# Patient Record
Sex: MALE | Race: WHITE
[De-identification: names, ages, dates, MRNs, and addresses within clinical notes are randomized per-mention and may not be internally consistent; named-entity substitution may affect disease eponyms.]

---

## 2017-03-21 ENCOUNTER — HOSPITAL ENCOUNTER (INPATIENT)
Dept: HOSPITAL 25 - ED | Age: 82
LOS: 2 days | Discharge: HOME | DRG: 381 | End: 2017-03-23
Attending: INTERNAL MEDICINE | Admitting: HOSPITALIST
Payer: MEDICARE

## 2017-03-21 DIAGNOSIS — K22.11: Primary | ICD-10-CM

## 2017-03-21 DIAGNOSIS — I48.91: ICD-10-CM

## 2017-03-21 DIAGNOSIS — I10: ICD-10-CM

## 2017-03-21 DIAGNOSIS — Z72.89: ICD-10-CM

## 2017-03-21 DIAGNOSIS — D62: ICD-10-CM

## 2017-03-21 DIAGNOSIS — G47.30: ICD-10-CM

## 2017-03-21 LAB
ADD DIFF/SLIDE REVIEW?: (no result)
ALBUMIN SERPL BCG-MCNC: 3.6 G/DL (ref 3.2–5.2)
ALP SERPL-CCNC: 54 U/L (ref 34–104)
ALT SERPL W P-5'-P-CCNC: 13 U/L (ref 7–52)
ANION GAP SERPL CALC-SCNC: 6 MMOL/L (ref 2–11)
AST SERPL-CCNC: 13 U/L (ref 13–39)
BUN SERPL-MCNC: 17 MG/DL (ref 6–24)
BUN/CREAT SERPL: 14.5 (ref 8–20)
CALCIUM SERPL-MCNC: 8.8 MG/DL (ref 8.6–10.3)
CHLORIDE SERPL-SCNC: 104 MMOL/L (ref 101–111)
GLOBULIN SER CALC-MCNC: 2.5 G/DL (ref 2–4)
GLUCOSE SERPL-MCNC: 101 MG/DL (ref 70–100)
HCO3 SERPL-SCNC: 24 MMOL/L (ref 22–32)
HCT VFR BLD AUTO: 19 % (ref 42–52)
HGB BLD-MCNC: 5.8 G/DL (ref 14–18)
HYPOCHROMIA BLD QL: (no result)
MACROCYTES BLD QL: (no result)
MCH RBC QN AUTO: 25 PG (ref 27–31)
MCHC RBC AUTO-ENTMCNC: 31 G/DL (ref 31–36)
MCV RBC AUTO: 82 FL (ref 80–94)
MICROCYTES BLD QL SMEAR: (no result)
POLYCHROMASIA BLD QL SMEAR: (no result)
POTASSIUM SERPL-SCNC: 3.9 MMOL/L (ref 3.5–5)
PROT SERPL-MCNC: 6.1 G/DL (ref 6.4–8.9)
RBC # BLD AUTO: 2.3 10^6/UL (ref 4–5.4)
SODIUM SERPL-SCNC: 134 MMOL/L (ref 133–145)
TROPONIN I SERPL-MCNC: 0.02 NG/ML (ref ?–0.04)
WBC # BLD AUTO: 8.1 10^3/UL (ref 3.5–10.8)

## 2017-03-21 PROCEDURE — 36415 COLL VENOUS BLD VENIPUNCTURE: CPT

## 2017-03-21 PROCEDURE — 94760 N-INVAS EAR/PLS OXIMETRY 1: CPT

## 2017-03-21 PROCEDURE — 93005 ELECTROCARDIOGRAM TRACING: CPT

## 2017-03-21 PROCEDURE — P9040 RBC LEUKOREDUCED IRRADIATED: HCPCS

## 2017-03-21 PROCEDURE — 85027 COMPLETE CBC AUTOMATED: CPT

## 2017-03-21 PROCEDURE — 86922 COMPATIBILITY TEST ANTIGLOB: CPT

## 2017-03-21 PROCEDURE — 83605 ASSAY OF LACTIC ACID: CPT

## 2017-03-21 PROCEDURE — 85025 COMPLETE CBC W/AUTO DIFF WBC: CPT

## 2017-03-21 PROCEDURE — 71020: CPT

## 2017-03-21 PROCEDURE — 94660 CPAP INITIATION&MGMT: CPT

## 2017-03-21 PROCEDURE — 85014 HEMATOCRIT: CPT

## 2017-03-21 PROCEDURE — 82272 OCCULT BLD FECES 1-3 TESTS: CPT

## 2017-03-21 PROCEDURE — 86901 BLOOD TYPING SEROLOGIC RH(D): CPT

## 2017-03-21 PROCEDURE — 84484 ASSAY OF TROPONIN QUANT: CPT

## 2017-03-21 PROCEDURE — 86900 BLOOD TYPING SEROLOGIC ABO: CPT

## 2017-03-21 PROCEDURE — 80053 COMPREHEN METABOLIC PANEL: CPT

## 2017-03-21 PROCEDURE — 83880 ASSAY OF NATRIURETIC PEPTIDE: CPT

## 2017-03-21 PROCEDURE — 86850 RBC ANTIBODY SCREEN: CPT

## 2017-03-21 PROCEDURE — 85018 HEMOGLOBIN: CPT

## 2017-03-21 RX ADMIN — SODIUM CHLORIDE SCH MLS/HR: 900 IRRIGANT IRRIGATION at 23:25

## 2017-03-21 NOTE — RAD
Indication: Shortness of breath.



2 views of the chest demonstrates no mediastinal shift. There is cardiomegaly. Left

pleural effusion is noted. There is likely a small right pleural effusion. Interstitial

edema is noted.



IMPRESSION: Cardiomegaly. Left pleural effusion is noted. Likely small right pleural

effusion is noted.

## 2017-03-21 NOTE — ED
Wayne ROMO Rebecca, scribed for Reji Ramos MD on 03/21/17 at 2124 .





Shortness of Breath





- HPI Summary


HPI Summary: 


Pt is an 86 y/o M BIBA who presents to ED c/o SOB. SOB began gradually 10 days 

ago and is slightly worse today than it has been previously. SOB characterized 

as mild dyspnea at exertion. Sx aggravated by exertion, alleviated by nothing. 

Denies any CP. Reports he does not typically use O2 per Os at home. 





- History of Current Complaint


Chief Complaint: EDShortnessOfBreath


Time Seen by Provider: 03/21/17 20:56


Hx Obtained From: Patient


Onset/Duration: Gradual Onset, Lasting Days - 10 days, Still Present


Timing: Constant


Dyspnea At: Exertion


Aggrevating Factors: Movement


Alleviating Factors: Nothing


Associated Signs & Symptoms: Negative





- Allergy/Home Medications


Allergies/Adverse Reactions: 


 Allergies











Allergy/AdvReac Type Severity Reaction Status Date / Time


 


No Known Allergies Allergy   Verified 12/10/14 15:44











Home Medications: 


 Home Medications





Albuterol 0.5% CONC NEB.SOL* 1 ml .SEE ORDER Q4HR PRN 03/21/17 [History 

Confirmed 03/21/17]


Cholecalciferol [Vitamin D] 1,000 unit PO DAILY 03/21/17 [History Confirmed 03/ 21/17]


Diltiazem CD CAP* [Cardizem CD CAP*] 240 mg PO DAILY 03/21/17 [History 

Confirmed 03/21/17]


Fluticasone-Salmeterol 100-50* [Advair Diskus 100-50*] 1 puff INH BID 03/21/17 [

History Confirmed 03/21/17]


Psyllium RODNEY* [Metamucil RODNEY*] 1 pkt PO DAILY 03/21/17 [History Confirmed 03/21/ 17]











PMH/Surg Hx/FS Hx/Imm Hx


Endocrine/Hematology History: 


   Denies: Hx Diabetes


Cardiovascular History: Reports: Hx Hypertension


   Denies: Hx Angina


Respiratory History: 


   Denies: Hx Asthma, Hx Chronic Obstructive Pulmonary Disease (COPD)


   Comment Only: Other Respiratory Problems/Disorders - pt states wheezes 

occasionally


Sensory History: Reports: Hx Contacts or Glasses


Opthamlomology History: Reports: Hx Contacts or Glasses


Infectious Disease History: No


Infectious Disease History: 


   Denies: Traveled Outside the [place] in Last 30 Days





- Family History


Known Family History: 


   Negative: Cardiac Disease, Hypertension, Diabetes





- Social History


Alcohol Use: Daily


Alcohol Amount: 3 MIXED DRINKS PER DAY


Substance Use Type: Reports: None


Hx Tobacco Use: No


Smoking Status (MU): Never Smoked Tobacco





Review of Systems


Negative: Chest Pain


Positive: Shortness Of Breath - dyspnea at exertion


All Other Systems Reviewed And Are Negative: Yes





Physical Exam


Triage Information Reviewed: Yes


Vital Signs On Initial Exam: 


 Initial Vitals











Temp Pulse Resp BP Pulse Ox


 


 98.4 F   82   20   130/65   100 


 


 03/21/17 20:56  03/21/17 20:56  03/21/17 20:56  03/21/17 20:56  03/21/17 20:56











Vital Signs Reviewed: Yes


Appearance: Positive: Well-Appearing, No Pain Distress


Skin: Positive: Warm, Pale


Eyes: Positive: JASEN


ENT: Positive: Hearing grossly normal


Neck: Positive: Supple


Respiratory/Lung Sounds: Positive: Clear to Auscultation, Breath Sounds Present


Cardiovascular: Positive: RRR


Abdomen Description: Positive: Nontender, Soft


Bowel Sounds: Positive: Present


Musculoskeletal: Positive: Strength/ROM Intact


Neurological: Positive: Sensory/Motor Intact, Alert, Oriented to Person Place, 

Time, Normal Gait


Psychiatric: Positive: Affect/Mood Appropriate





- Ashish Coma Scale


Coma Scale Total: 15





Diagnostics





- Vital Signs


 Vital Signs











  Temp Pulse Resp BP Pulse Ox


 


 03/21/17 20:56  98.4 F  82  20  130/65  100














- Laboratory


Result Diagrams: 


 03/22/17 03:05





 03/21/17 21:30


Lab Statement: Any lab studies that have been ordered have been reviewed, and 

results considered in the medical decision making process.





- Radiology


  ** CXR


Radiology Interpretation Completed By: Radiologist - Cardiomegaly. Left pleural 

effusion is noted. Likely small right pleural effusion is noted.





- EKG


  ** 2203


Cardiac Rate: NL - 87 bpm


EKG Rhythm: Atrial Fibrillation


EKG Interpretation: RBBB





Re-Evaluation





- Re-Evaluation


  ** First Eval


Comment: results d/w pt.  pt with dark stools for 1 month





Course/Dx





- Course


Assessment/Plan: Pt is an 86 y/o M BIBA with a CC of mild dyspnea at exertion 

for 10 days, aggravated by exertion. Denies any CP. Hgb 5.8. . CXR 

reveals "Cardiomegaly. Left pleural effusion is noted. Likely small right 

pleural effusion is noted." EKG reveals A fib and RBBB. Discussed care of pt 

with Dr. Sandoval to inform him of pt.





- Diagnoses


Provider Diagnoses: 


 GI bleed








- Physician Notifications


Discussed Care of Patient With: Dr. Sandoval, hopsitalist, who was informed of pt.


Time Discussed With Above Provider: 22:00


Instructed by Provider To: Admit As Inpatient





Discharge





- Discharge Plan


Condition: Guarded


Disposition: ADMITTED TO [facility]





The documentation as recorded by the Wayne berry Rebecca accurately 

reflects the service I personally performed and the decisions made by me, 

Reji Ramos MD.

## 2017-03-22 LAB
HCT VFR BLD AUTO: 16 % (ref 42–52)
HCT VFR BLD AUTO: 19 % (ref 42–52)
HCT VFR BLD AUTO: 22 % (ref 42–52)
HCT VFR BLD AUTO: 23 % (ref 42–52)
HCT VFR BLD AUTO: 26 % (ref 42–52)
HGB BLD-MCNC: 4.9 G/DL (ref 14–18)
HGB BLD-MCNC: 5.9 G/DL (ref 14–18)
HGB BLD-MCNC: 6.8 G/DL (ref 14–18)
HGB BLD-MCNC: 7.2 G/DL (ref 14–18)
HGB BLD-MCNC: 8.2 G/DL (ref 14–18)

## 2017-03-22 PROCEDURE — 0DJ08ZZ INSPECTION OF UPPER INTESTINAL TRACT, VIA NATURAL OR ARTIFICIAL OPENING ENDOSCOPIC: ICD-10-PCS | Performed by: INTERNAL MEDICINE

## 2017-03-22 PROCEDURE — 30233N1 TRANSFUSION OF NONAUTOLOGOUS RED BLOOD CELLS INTO PERIPHERAL VEIN, PERCUTANEOUS APPROACH: ICD-10-PCS | Performed by: INTERNAL MEDICINE

## 2017-03-22 RX ADMIN — OMEPRAZOLE SCH MG: 20 CAPSULE, DELAYED RELEASE ORAL at 21:32

## 2017-03-22 RX ADMIN — MOMETASONE FUROATE AND FORMOTEROL FUMARATE DIHYDRATE SCH: 100; 5 AEROSOL RESPIRATORY (INHALATION) at 21:15

## 2017-03-22 RX ADMIN — SODIUM CHLORIDE SCH MLS/HR: 900 IRRIGANT IRRIGATION at 01:57

## 2017-03-22 RX ADMIN — PANTOPRAZOLE SODIUM SCH MLS/HR: 40 INJECTION, POWDER, FOR SOLUTION INTRAVENOUS at 12:27

## 2017-03-22 RX ADMIN — PANTOPRAZOLE SODIUM SCH MLS/HR: 40 INJECTION, POWDER, FOR SOLUTION INTRAVENOUS at 01:47

## 2017-03-22 RX ADMIN — SODIUM CHLORIDE SCH MLS/HR: 900 IRRIGANT IRRIGATION at 11:00

## 2017-03-22 NOTE — PN
Subjective


Date of Service: 03/22/17


Interval History: 





Pt seen and examined with 2 friends present after EGD


Hungry and just finished meal


Has no abdominal pain 


No longer feels LH, no CP, SOB. Has not been up to walk





Objective


Active Medications: 








Mometasone Furoate/Formoterol Fumar (Dulera 100/5 Mdi*)  2 puff INH BID DARLINE


Omeprazole (Prilosec Cap*)  20 mg PO BID DARLINE








 Vital Signs











  03/21/17 03/21/17 03/21/17





  23:06 23:30 23:38


 


Temperature   98.8 F


 


Pulse Rate 85 96 


 


Respiratory 22 22 





Rate   


 


Blood Pressure 130/65 126/67 





(mmHg)   


 


O2 Sat by Pulse 97 95 





Oximetry   














  03/21/17 03/22/17 03/22/17





  23:45 00:00 00:01


 


Temperature   


 


Pulse Rate 90 85 95


 


Respiratory 21 23 22





Rate   


 


Blood Pressure 130/66  130/81





(mmHg)   


 


O2 Sat by Pulse 96 96 96





Oximetry   














  03/22/17 03/22/17 03/22/17





  00:08 00:15 01:20


 


Temperature  98.4 F 98.1 F


 


Pulse Rate 95 111 99


 


Respiratory 22 18 20





Rate   


 


Blood Pressure  121/56 133/86





(mmHg)   


 


O2 Sat by Pulse 95 100 





Oximetry   














  03/22/17 03/22/17 03/22/17





  02:43 03:13 03:57


 


Temperature  98.5 F 98.4 F


 


Pulse Rate  106 74


 


Respiratory 18 16 20





Rate   


 


Blood Pressure  122/68 109/65





(mmHg)   


 


O2 Sat by Pulse  94 





Oximetry   














  03/22/17 03/22/17 03/22/17





  04:12 07:15 08:00


 


Temperature 98.2 F 98.2 F 


 


Pulse Rate 84 101 


 


Respiratory 21 20 16





Rate   


 


Blood Pressure 119/59 126/68 





(mmHg)   


 


O2 Sat by Pulse  96 





Oximetry   














  03/22/17 03/22/17





  11:10 15:34


 


Temperature 98.5 F 98.1 F


 


Pulse Rate 85 97


 


Respiratory 20 20





Rate  


 


Blood Pressure 121/71 127/70





(mmHg)  


 


O2 Sat by Pulse 97 96





Oximetry  











Oxygen Devices in Use Now: None


Appearance: NAD, younger than stated age


Eyes: No Scleral Icterus, PERRLA


Ears/Nose/Mouth/Throat: Clear Oropharnyx, Mucous Membranes Moist


Neck: NL Appearance and Movements; NL JVP, Trachea Midline


Respiratory: Symmetrical Chest Expansion and Respiratory Effort, Clear to 

Auscultation


Cardiovascular: - - IRIR


Abdominal: NL Sounds; No Tenderness; No Distention, No Hepatosplenomegaly


Lymphatic: No Cervical Adenopathy


Extremities: - - trace-1+ edema


Skin: No Rash or Ulcers


Neurological: Alert and Oriented x 3


Result Diagrams: 


 03/22/17 15:56





 03/21/17 21:30





Assess/Plan/Problems-Billing


Assessment: 





86 yo M p/w increasing SOB found with anemia s/p EGD 3/22 found with esophageal 

erosions. 





- Patient Problems


(1) Acute blood loss anemia


Comment: s/p 2 units PRBC, 3rd this evening for Hb <7


Suspected in setting of esophageal erosion on pradaxa


holding pradaxa


repeat H/H 1 hr after transfusion and again in AM   





(2) Afib


Comment: Holding pradaxa


discussed risk benefit with pt and he is in agreement with not restarting AC at 

this time   





(3) Esophageal erosions


Comment: Concern for pill esophagitis. Pt does report dry mouth and difficulty 

swallowing tabs (vs caps) although does not have medications he is currently 

taking in tab form





Start omeprazole   





(4) Sleep apnea


Comment: CPAP   





(5) DVT prophylaxis


Comment: SCDs in setting of GIB

## 2017-03-22 NOTE — HP
DATE OF ADMISSION:  03/21/17

 

CHIEF COMPLAINT:  Shortness of breath.

 

HISTORY OF PRESENT ILLNESS:  The patient is an 87-year-old gentleman who stated 
about 10 days ago he started feeling increased shortness of breath.  He is not 
worse when he sits or lies down, but it happens with almost minimal exertion. 
He has no chest pain.  His stools have been darker and may have been black. He 
has not had any recent use of Advil or Motrin.  He has had no nausea, vomiting 
and no abdominal pain.  He went to a restaurant [employer] and his friend became 
concerned, and called EMT. When he got to the ER, he was found to have 
hemoglobin of 5.8.

 

PAST MEDICAL HISTORY:  He has a past medical history significant for atrial 
fibrillation, diverticulitis, hip fracture, sleep apnea.

 

ALLERGIES:  He has no known drug allergies.

 

CURRENT MEDICATIONS:

1.  Metamucil one pack daily.

2.  Phosphatidylserine 300 mg daily

3.  Furosemide 20 mg p.o. 2 to 3 times a week.

4.  Advair Diskus 100/50 one puff twice daily

5.  Diltiazem  mg daily.

6.  Pradaxa 150 mg twice daily.

7.  Cholecalciferol 1000 units daily.

8.  Albuterol 1 cc every 4 hours as needed.

 

FAMILY HISTORY:  Reviewed and noncontributory.

 

SOCIAL HISTORY:  No tobacco, occasional alcohol.  No recreational drug use.  He 
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  His friend, Billy Wiley, [phone], is his healthcare proxy.

 

REVIEW OF SYSTEMS:  A 14-point review of systems was completed with the 
patient. All pertinent positives and negatives are in the history of present 
illness, otherwise negative.

 

                               PHYSICAL EXAMINATION

 

GENERAL:  A pleasant gentleman lying in bed, in no acute distress.

 

VITAL SIGNS:  Temperature 98.8 degrees, heart rate 96 beats per minute, 
respiratory rate 21 breaths per minute, pulse ox 96%, blood pressure 126/67.

 

HEENT:  Normocephalic, atraumatic.  Pupils are equal, round and reactive to 
light. Moist mucous membranes.

 

NECK:  Supple.  No JVD, bruits, palpable thyroid, or lymphadenopathy.

 

CHEST:  Clear to auscultation and percussion bilaterally.

 

CARDIOVASCULAR:  S1, S2 appreciated.

 

ABDOMEN:  Positive bowel sounds in all 4 quadrants.  Soft, nontender, and 
nondistended.

 

EXTREMITIES:  No cyanosis, clubbing, or edema.  +2 peripheral pulses 
bilaterally.

 

NEURO:  Alert and oriented x3.  Moves all extremities.

 

SKIN:  No rashes or abnormalities.

 

LAB DATA:  White count 8.1, hemoglobin 5.8, hematocrit 19, platelets 312, MCV 
82. Sodium 134, potassium 3.9, chloride 104, CO2 24, BUN 17, creatinine 1.7, 
glucose 101, lactic acid 2.1.

 

Chest x-ray shows cardiomegaly, left pleural fusion is noted, likely small 
right pleural fusion.

 

EKG shows AFib with a moderate ventricular response, no acute ST-T wave changes.

 

ASSESSMENT AND PLAN:

1.  Shortness of breath.  I believe this is likely related to his anemia.  His 
MCV is not too low, but I suspect he might have had a bleed over the last 
couple of weeks.  I will transfuse him 2 units of packed red blood cells.  
Recheck H\T\H every 6 hours.  We will ask GI to see him in the morning for 
scope.

2.  Atrial fibrillation.  We will need to hold Pradaxa.  Rate is adequately 
controlled at this time.

3.  FEN.  NPO, awaiting likely scope.

4.  DVT prophylaxis.  Sequential compression stockings.

5.  The patient is a full code. TIME SPENT: Over 75 minutes were spent on this 
H and P, more than 40 minutes of which were spent in direct face-to-face 
contact with the patient in evaluation, physical exam, counseling, and 
coordination of care.

 

CC:  Dr. Kimberley Tavares* 

 

 [id number]/CPS #: [id number]

MTDD

## 2017-03-23 VITALS — SYSTOLIC BLOOD PRESSURE: 146 MMHG | DIASTOLIC BLOOD PRESSURE: 76 MMHG

## 2017-03-23 LAB
HCT VFR BLD AUTO: 26 % (ref 42–52)
HGB BLD-MCNC: 8.2 G/DL (ref 14–18)
MCH RBC QN AUTO: 26 PG (ref 27–31)
MCHC RBC AUTO-ENTMCNC: 32 G/DL (ref 31–36)
MCV RBC AUTO: 83 FL (ref 80–94)
RBC # BLD AUTO: 3.13 10^6/UL (ref 4–5.4)
WBC # BLD AUTO: 10.2 10^3/UL (ref 3.5–10.8)

## 2017-03-23 RX ADMIN — OMEPRAZOLE SCH MG: 20 CAPSULE, DELAYED RELEASE ORAL at 09:00

## 2017-03-23 RX ADMIN — MOMETASONE FUROATE AND FORMOTEROL FUMARATE DIHYDRATE SCH PUFF: 100; 5 AEROSOL RESPIRATORY (INHALATION) at 13:19

## 2017-03-23 NOTE — PRO
PROCEDURE NOTE:

 

DATE OF PROCEDURE:  03/22/17

 

PROCEDURE PERFORMED:  Upper endoscopy.

 

MEDICINE:  Versed 4 mg IV.

 

NARRATIVE:  This is an 87-year-old gentleman presenting with anemia and the 
report of having black stools for about a week to two.  The patient does have a 
history of atrial fibrillation, on Pradaxa and was starting to feel short of 
breath the last few days.  He also mentions that his stools have been black.  
For that reason, he was seen in the emergency room where he was noted to be 
anemic with a hemoglobin of about 5.  He was transfused and is feeling better. 
For this reason, upper endoscopy is being carried out.

 

DESCRIPTION OF PROCEDURE:  After the procedure was discussed with the patient, 
risks and benefits were outlined, written consent was obtained.  The patient 
was placed in the left lateral decubitus position and conscious sedation was 
administered.  A video diagnostic gastroscope was inserted orally and advanced 
very carefully into the esophagus.  The esophagus, stomach, and duodenum to the 
second to third portion were well visualized.  The patient tolerated the 
procedure well. There were no immediate complications.

 

FINDINGS:  The proximal esophagus was normal.  However, the mid esophagus had 
some scattered erosions that were relatively deep, but not bleeding.  The more 
distal aspect of the esophagus was normal without any further erosions or any 
other lesion.  The stomach was entered.  There was bile in the stomach, but 
certainly no blood.  The gastric mucosa was normal.  There was no ulceration or 
inflammatory change.  The pylorus was normal and patent.  The duodenal bulb was 
normal and the second to third portion of the duodenum was normal with a normal 
folding pattern.

 

CONCLUSION:  Erosions identified in the mid esophagus which may have accounted 
for the patient's bleeding and otherwise normal upper endoscopy.

 

RECOMMENDATIONS:  The location of these erosions makes one wonder about a 
previous episode of pill esophagitis as it seems that this is right above the 
annabel, a common place where pills could get caught. In any event, I think it 
is healing and given his improvement in his H and H and no further bleeding, he 
could probably be observed.  If Pradaxa is necessary, that can probably be 
started in the next couple of days.  I will certainly keep him on a proton pump 
inhibitor for at least a month or two.

 

 [id number]/CPS #: [id number]

E.J. Noble HospitalPIETRO

## 2017-03-24 NOTE — DS
DISCHARGE SUMMARY:

 

DATE OF ADMISSION:  03/21/17

 

DATE OF DISCHARGE:  03/23/17

 

PRIMARY CARE PROVIDER:  Nancy Hurst.

 

PRIMARY DIAGNOSIS:  Gastrointestinal hemorrhage in the setting of esophageal 
erosions.

 

SECONDARY DIAGNOSES:  Include:

1.  Atrial fibrillation, on Pradaxa.

2.  Sleep apnea.

 

MEDICATIONS ON DISCHARGE:  Include:

1.  Metamucil 1 packet daily.

2.  Phosphatidylserine 300 mg daily

3.  Lasix 20 mg 2 to 3 times weekly.

4.  Advair 100/50 mcg 1 puff twice daily.

5.  Diltiazem  mg daily.

6.  Vitamin D 1000 units daily.

7.  Albuterol nebulizer 4 times a day as needed for shortness of breath.

8.  Prilosec 20 mg twice daily.

9.  Ferrous sulfate liquid 220 mg daily.

 

PERTINENT LABORATORY DATA:  Hemoglobin on presentation 4.9, on discharge 8.2 
after receiving of 3 units packed red blood cells.

 

PROCEDURES PERFORMED DURING HOSPITAL STAY:  Upper endoscopy performed by Dr. Hare on 03/22/17, conclusion:  Erosions identified in the mid esophagus, may 
have accounted for the patient's bleeding and otherwise normal upper endoscopy.

 

HISTORY OF PRESENT ILLNESS AND HOSPITAL COURSE:  This is an 87-year-old well 
functioning man who has been in usual state of health until prior to admission 
about 10 days, started to having increasing shortness of breath.  He was 
directed to the emergency room by one of his friends.  Additionally, he had 
noticed that his stools had been darker brown and possibly black prior to 
presentation.  His hemoglobin on presentation was notably 4.9 as indicated 
above likely accounting for the patient's symptoms including shortness of 
breath.  He received 2 units of packed red blood cells with adequate response 
to hemoglobin of 7.2.  He had endoscopy with resulted as indicated above 
notable for mid esophagus esophageal erosions, this was concerning for pill 
esophagitis.  In discussion with the patient, he does acknowledge that 
swallowing tab is difficult for him; however, does not have any medications in 
tablet form.  He was directed to crush any tablets that he should receive in 
the future.  Additionally, omeprazole 20 mg twice daily was added to the patient
's medication list in addition to iron in order to supplement his return to 
normal hematocrit.  There are no complications during the patient's hospital 
stay.  He will be restarted on all of his home medications except for Pradaxa.  
The risks and benefits withholding Pradaxa were discussed with the patient.  We 
agreed that holding for at least several weeks would be in his best interest in 
the setting of recent GI hemorrhage and known esophageal erosions.

 

At followup, please;

1.  Evaluate for resumption of Pradaxa.

2.  Recommend following hemoglobin and hematocrit closely, possibly every 6 
weeks to monitor for slow development of anemia.  If the patient has recurrent 
anemia, he may benefit from colonoscopy.

3.  No other specific labs or vitals that need followup.

 

Reasons to return to the hospital including but not limited to recurrent or 
worsening symptoms, including shortness of breath, chest pain, lightheadedness, 
loss of consciousness or near loss of consciousness, or dark black stools or 
bleeding from any source were discussed with the patient.  He acknowledged 
understanding.

 

TIME SPENT:  Greater than 45 minutes was spent on discharge of this patient 
with greater than half the time spent face-to-face with the patient.



CC:  Dr. Bonnie Guerrero*

 

 [id number]/CPS #: [id number]

BELL

## 2018-09-18 ENCOUNTER — HOSPITAL ENCOUNTER (EMERGENCY)
Dept: HOSPITAL 25 - ED | Age: 83
Discharge: HOME | End: 2018-09-18
Payer: MEDICARE

## 2018-09-18 VITALS — DIASTOLIC BLOOD PRESSURE: 86 MMHG | SYSTOLIC BLOOD PRESSURE: 132 MMHG

## 2018-09-18 DIAGNOSIS — R06.02: ICD-10-CM

## 2018-09-18 DIAGNOSIS — F10.121: Primary | ICD-10-CM

## 2018-09-18 DIAGNOSIS — I48.91: ICD-10-CM

## 2018-09-18 LAB
BASOPHILS # BLD AUTO: 0.1 10^3/UL (ref 0–0.2)
EOSINOPHIL # BLD AUTO: 0.2 10^3/UL (ref 0–0.6)
HCT VFR BLD AUTO: 44 % (ref 42–52)
HGB BLD-MCNC: 15.1 G/DL (ref 14–18)
LYMPHOCYTES # BLD AUTO: 2.2 10^3/UL (ref 1–4.8)
MCH RBC QN AUTO: 36 PG (ref 27–31)
MCHC RBC AUTO-ENTMCNC: 34 G/DL (ref 31–36)
MCV RBC AUTO: 105 FL (ref 80–94)
MONOCYTES # BLD AUTO: 0.6 10^3/UL (ref 0–0.8)
NEUTROPHILS # BLD AUTO: 4.6 10^3/UL (ref 1.5–7.7)
NRBC # BLD AUTO: 0 10^3/UL
NRBC BLD QL AUTO: 0.1
PLATELET # BLD AUTO: 219 10^3/UL (ref 150–450)
RBC # BLD AUTO: 4.23 10^6/UL (ref 4–5.4)
WBC # BLD AUTO: 7.8 10^3/UL (ref 3.5–10.8)

## 2018-09-18 PROCEDURE — 84484 ASSAY OF TROPONIN QUANT: CPT

## 2018-09-18 PROCEDURE — 80048 BASIC METABOLIC PNL TOTAL CA: CPT

## 2018-09-18 PROCEDURE — 80320 DRUG SCREEN QUANTALCOHOLS: CPT

## 2018-09-18 PROCEDURE — 70450 CT HEAD/BRAIN W/O DYE: CPT

## 2018-09-18 PROCEDURE — 83880 ASSAY OF NATRIURETIC PEPTIDE: CPT

## 2018-09-18 PROCEDURE — 93005 ELECTROCARDIOGRAM TRACING: CPT

## 2018-09-18 PROCEDURE — 99283 EMERGENCY DEPT VISIT LOW MDM: CPT

## 2018-09-18 PROCEDURE — 36415 COLL VENOUS BLD VENIPUNCTURE: CPT

## 2018-09-18 PROCEDURE — G0480 DRUG TEST DEF 1-7 CLASSES: HCPCS

## 2018-09-18 PROCEDURE — 85025 COMPLETE CBC W/AUTO DIFF WBC: CPT

## 2018-09-18 NOTE — RAD
EXAM:

  CT Head Without Intravenous Contrast



CLINICAL HISTORY:

  89 years old, male; Signs and symptoms; Altered mental status/memory loss; 

Confusion or disorientation



TECHNIQUE:

  Axial computed tomography images of the head/brain without intravenous 

contrast.  All CT scans at this facility use at least one of these dose 

optimization techniques: automated exposure control; mA and/or kV adjustment 

per patient size (includes targeted exams where dose is matched to clinical 

indication); or iterative reconstruction.



COMPARISON:

  No relevant prior studies available.



FINDINGS:

  Brain:  Mild periventricular and subcortical low attenuation without adjacent 

mass effect.

  Ventricles:  The ventricles and extraventricular CSF spaces are widened 

although symmetrically positioned along the midline.

  Bones/joints:  No acute fracture.  No suspicious osseous lesions.

  Soft tissues:  Normal.

  Vasculature:  The vasculature demonstrates diffuse mild atherosclerotic 

calcification.

  Sinuses:  Normal as visualized.

  Mastoid air cells:  Normal as visualized.  No mastoid effusion.



IMPRESSION:     

  1. No acute intracranial abnormality.



  2. Age-related atrophy and mild chronic small vessel ischemic disease.

## 2018-09-18 NOTE — XMS REPORT
[facility] - Continuity of Care Document

 Created on:2018



Patient:RAD SMART

Sex:Male

:1929

External Reference #:[id number]





Demographics







 Address  [address] [place] [address]

 

 Home Phone  [phone]

 

 Preferred Language  en

 

 Marital Status  Not  or 

 

 Catholic Affiliation  Unknown

 

 Race  White

 

 Ethnic Group  Not  or 









Author







 Organization  [facility]









Care Team Providers







 Name  Role  Phone

 

 YONATAN BRUNNER  Primary Care Physician  [phone]









Allergies and Intolerances

No Known Allergies



Medications







 RxNorm  Medication  Dose  Route  Instructions  Start  End Date  Status



           Date    

 

 2418  Cholecalciferol  2000 unit  oral  orally every day      Active

 

 3595024  dabigatran etexilate  150 mg  oral  orally 2 times per      Active



   150 MG Oral Capsule      day (administer at      



         approximately same      



         time(s) each day)      

 

   Diltiazem Oral 24 hr  240 mg  oral  orally every day      Active



   Cap            

 

   Doxycycline Hyclate  100 mg  oral  orally 2 times per      Active



   Oral      day (5 days)      

 

 753748  Furosemide 20 MG  20 mg  oral  orally every day      Active



   Oral Tablet            

 

 575851  Levofloxacin 500 MG  500 mg  oral  orally every day      Active



   Oral Tablet      (5 days)      

 

 630173  Memantine  5 mg  oral  orally 2 times per      Active



   hydrochloride 5 MG      day      



   Oral Tablet            

 

 7646  Omeprazole  20 mg  oral  orally every day      Active







Problems







 Code  Code System  Problem Name  Start Date  End Date  Status

 

 00281673  SNOMED-CT  Pneumothorax      Active







Procedures

No data in the system



Results

No data in the system



Social History







 Code  Code System  Social History  Description  Dates Observed



     Observation    

 

 505503620  SNOMED CT  Current Smoking  Unknown if ever  



     Status  smoked  

 

 UNK  AdministrativeGender  Sex Assigned At  Unknown  



     Birth    







Vital Signs

No data in the system



Goals Section

No data in the system



Health Concerns

No data in the systemEncounter Diagnosis









 Date  Code  Code System  Diagnosis  Status

 

   R04.2  ICD10  HEMOPTYSIS  Active







Advance Directives

PT STATES NO ADVANCE DIRECTIVES





 Directive Type  Effective Date    Notes  Supporting Document









 Name  Address  Phone









 No Directive Type  2018 8:31:00  Not Specified  Not Specified  Not 
Specified  None  No



 specified  PM          



*[facility] - CONSENT IS YES





 Directive Type  Effective Date    Notes  Supporting Document









 Name  Address  Phone









 No Directive Type  2012  Not Specified  Not Specified  Not Specified  
None  No



 specified  10:01:04 PM          







Family History

No data in the system



Functional Status

No data in the system



Immunizations

No data in the system



Medical Equipment

No data in the system



Mental Status

No data in the system



Assessment and Plan

Assessments

No data in the systemPlan Of Treatment

No data in the systemPending Tests

No data in the system



Hospital Discharge Instructions

No data in the system



Reason for Visit

No data in the system

## 2018-09-18 NOTE — XMS REPORT
[facility] - Continuity of Care Document

 Created on:2018



Patient:RAD SMART

Sex:Male

:1929

External Reference #:[id number]





Demographics







 Address  [address] [place] [address]

 

 Home Phone  [phone]

 

 Preferred Language  en

 

 Marital Status  Not  or 

 

 Rastafarian Affiliation  Unknown

 

 Race  White

 

 Ethnic Group  Not  or 









Author







 Organization  [facility]









Care Team Providers







 Name  Role  Phone

 

 YONATAN BRUNNER  Primary Care Physician  [phone]









Allergies and Intolerances

No Known Allergies



Medications







 RxNorm  Medication  Dose  Route  Instructions  Start  End Date  Status



           Date    

 

 2418  Cholecalciferol  2000 unit  oral  orally every day      Active

 

 4035483  dabigatran etexilate  150 mg  oral  orally 2 times per      Active



   150 MG Oral Capsule      day (administer at      



         approximately same      



         time(s) each day)      

 

   Diltiazem Oral 24 hr  240 mg  oral  orally every day      Active



   Cap            

 

   Doxycycline Hyclate  100 mg  oral  orally 2 times per      Active



   Oral      day (5 days)      

 

 232051  Furosemide 20 MG  20 mg  oral  orally every day      Active



   Oral Tablet            

 

 736921  Levofloxacin 500 MG  500 mg  oral  orally every day      Active



   Oral Tablet      (5 days)      

 

 092211  Memantine  5 mg  oral  orally 2 times per      Active



   hydrochloride 5 MG      day      



   Oral Tablet            

 

 7646  Omeprazole  20 mg  oral  orally every day      Active







Problems







 Code  Code System  Problem Name  Start Date  End Date  Status

 

 87852096  SNOMED-CT  Pneumothorax      Active







Procedures

No data in the system



Results

No data in the system



Social History







 Code  Code System  Social History  Description  Dates Observed



     Observation    

 

 770781376  SNOMED CT  Current Smoking  Unknown if ever  



     Status  smoked  

 

 UNK  AdministrativeGender  Sex Assigned At  Unknown  



     Birth    







Vital Signs

No data in the system



Goals Section

No data in the system



Health Concerns

No data in the systemEncounter Diagnosis









 Date  Code  Code System  Diagnosis  Status

 

   R04.2  ICD10  HEMOPTYSIS  Active







Advance Directives

PT STATES NO ADVANCE DIRECTIVES





 Directive Type  Effective Date    Notes  Supporting Document









 Name  Address  Phone









 No Directive Type  2018 8:31:00  Not Specified  Not Specified  Not 
Specified  None  No



 specified  PM          



*[facility] - CONSENT IS YES





 Directive Type  Effective Date    Notes  Supporting Document









 Name  Address  Phone









 No Directive Type  2012  Not Specified  Not Specified  Not Specified  
None  No



 specified  10:01:04 PM          







Family History

No data in the system



Functional Status

No data in the system



Immunizations

No data in the system



Medical Equipment

No data in the system



Mental Status

No data in the system



Assessment and Plan

Assessments

No data in the systemPlan Of Treatment

No data in the systemPending Tests

No data in the system



Hospital Discharge Instructions

No data in the system



Reason for Visit

No data in the system

## 2018-09-18 NOTE — XMS REPORT
[facility] - Continuity of Care Document

 Created on:2018



Patient:RAD SMART

Sex:Male

:1929

External Reference #:[id number]





Demographics







 Address  [address] [place] [address]

 

 Home Phone  [phone]

 

 Preferred Language  en

 

 Marital Status  Not  or 

 

 Sikh Affiliation  Unknown

 

 Race  White

 

 Ethnic Group  Not  or 









Author







 Organization  [facility]









Care Team Providers







 Name  Role  Phone

 

 YONATAN BRUNNER  Primary Care Physician  [phone]









Allergies and Intolerances

No Known Allergies



Medications







 RxNorm  Medication  Dose  Route  Instructions  Start  End Date  Status



           Date    

 

 2418  Cholecalciferol  2000 unit  oral  orally every day      Active

 

 5920976  dabigatran etexilate  150 mg  oral  orally 2 times per      Active



   150 MG Oral Capsule      day (administer at      



         approximately same      



         time(s) each day)      

 

   Diltiazem Oral 24 hr  240 mg  oral  orally every day      Active



   Cap            

 

   Doxycycline Hyclate  100 mg  oral  orally 2 times per      Active



   Oral      day (5 days)      

 

 956595  Furosemide 20 MG  20 mg  oral  orally every day      Active



   Oral Tablet            

 

 584535  Levofloxacin 500 MG  500 mg  oral  orally every day      Active



   Oral Tablet      (5 days)      

 

 742848  Memantine  5 mg  oral  orally 2 times per      Active



   hydrochloride 5 MG      day      



   Oral Tablet            

 

 7646  Omeprazole  20 mg  oral  orally every day      Active







Problems







 Code  Code System  Problem Name  Start Date  End Date  Status

 

 22216235  SNOMED-CT  Pneumothorax      Active







Procedures

No data in the system



Results

No data in the system



Social History







 Code  Code System  Social History  Description  Dates Observed



     Observation    

 

 726532559  SNOMED CT  Current Smoking  Unknown if ever  



     Status  smoked  

 

 UNK  AdministrativeGender  Sex Assigned At  Unknown  



     Birth    







Vital Signs

No data in the system



Goals Section

No data in the system



Health Concerns

No data in the systemEncounter Diagnosis









 Date  Code  Code System  Diagnosis  Status

 

   R04.2  ICD10  HEMOPTYSIS  Active







Advance Directives

PT STATES NO ADVANCE DIRECTIVES





 Directive Type  Effective Date    Notes  Supporting Document









 Name  Address  Phone









 No Directive Type  2018 8:31:00  Not Specified  Not Specified  Not 
Specified  None  No



 specified  PM          



*[facility] - CONSENT IS YES





 Directive Type  Effective Date    Notes  Supporting Document









 Name  Address  Phone









 No Directive Type  2012  Not Specified  Not Specified  Not Specified  
None  No



 specified  10:01:04 PM          







Family History

No data in the system



Functional Status

No data in the system



Immunizations

No data in the system



Medical Equipment

No data in the system



Mental Status

No data in the system



Assessment and Plan

Assessments

No data in the systemPlan Of Treatment

No data in the systemPending Tests

No data in the system



Hospital Discharge Instructions

No data in the system



Reason for Visit

No data in the system

## 2018-09-18 NOTE — XMS REPORT
[facility] - Continuity of Care Document

 Created on:2018



Patient:RAD SMART

Sex:Male

:1929

External Reference #:[id number]





Demographics







 Address  [address] [place] [address]

 

 Home Phone  [phone]

 

 Preferred Language  en

 

 Marital Status  Not  or 

 

 Holiness Affiliation  Unknown

 

 Race  White

 

 Ethnic Group  Not  or 









Author







 Organization  [facility]









Care Team Providers







 Name  Role  Phone

 

 YONATAN BRUNNER  Primary Care Physician  [phone]









Allergies and Intolerances

No Known Allergies



Medications







 RxNorm  Medication  Dose  Route  Instructions  Start  End Date  Status



           Date    

 

 2418  Cholecalciferol  2000 unit  oral  orally every day      Active

 

 0897464  dabigatran etexilate  150 mg  oral  orally 2 times per      Active



   150 MG Oral Capsule      day (administer at      



         approximately same      



         time(s) each day)      

 

   Diltiazem Oral 24 hr  240 mg  oral  orally every day      Active



   Cap            

 

   Doxycycline Hyclate  100 mg  oral  orally 2 times per      Active



   Oral      day (5 days)      

 

 324610  Furosemide 20 MG  20 mg  oral  orally every day      Active



   Oral Tablet            

 

 793177  Levofloxacin 500 MG  500 mg  oral  orally every day      Active



   Oral Tablet      (5 days)      

 

 819218  Memantine  5 mg  oral  orally 2 times per      Active



   hydrochloride 5 MG      day      



   Oral Tablet            

 

 7646  Omeprazole  20 mg  oral  orally every day      Active







Problems







 Code  Code System  Problem Name  Start Date  End Date  Status

 

 82265467  SNOMED-CT  Pneumothorax      Active







Procedures

No data in the system



Results

No data in the system



Social History







 Code  Code System  Social History  Description  Dates Observed



     Observation    

 

 889218767  SNOMED CT  Current Smoking  Unknown if ever  



     Status  smoked  

 

 UNK  AdministrativeGender  Sex Assigned At  Unknown  



     Birth    







Vital Signs

No data in the system



Goals Section

No data in the system



Health Concerns

No data in the systemEncounter Diagnosis









 Date  Code  Code System  Diagnosis  Status

 

   R07.89  ICD10  OTHER CHEST PAIN  Active







Advance Directives

PT STATES NO ADVANCE DIRECTIVES





 Directive Type  Effective Date    Notes  Supporting Document









 Name  Address  Phone









 No Directive Type  2018 8:31:00  Not Specified  Not Specified  Not 
Specified  None  No



 specified  PM          



*[facility] - CONSENT IS YES





 Directive Type  Effective Date    Notes  Supporting Document









 Name  Address  Phone









 No Directive Type  2012  Not Specified  Not Specified  Not Specified  
None  No



 specified  10:01:04 PM          







Family History

No data in the system



Functional Status

No data in the system



Immunizations

No data in the system



Medical Equipment

No data in the system



Mental Status

No data in the system



Assessment and Plan

Assessments

No data in the systemPlan Of Treatment

No data in the systemPending Tests

No data in the system



Hospital Discharge Instructions

No data in the system



Reason for Visit

No data in the system

## 2018-09-18 NOTE — XMS REPORT
Matti Anguiano

 Created on:2018



Patient:Matti Anguiano

Sex:Male

:1929

External Reference #:[id number]





Demographics







 Address  [address]



   [place] [address]

 

 Home Phone  [phone]

 

 Mobile Phone  [phone]

 

 Email Address  [email or web address]

 

 Preferred Language  English

 

 Marital Status  Not  Or 

 

 Rastafarian Affiliation  Unknown

 

 Race  White

 

 Ethnic Group  Not  Or 









Author







 Organization  [employer]

 

 Address  [address]



   [place] [address]

 

 Phone  [phone]









Support







 Name  Relationship  Address  Phone

 

 Eunice Mckinney  Unavailable  Unavailable  [phone]

 

 Billy Espana  Unavailable  Unavailable  [phone]









Care Team Providers







 Name  Role  Phone

 

 Bonnie Guerrero  Primary Care Physician  Unavailable









Payers







 Type  Date  Identification Numbers  Payment Provider  Subscriber

 

 Medicare Primary  Effective:  Policy Number:  Medicare  Matti Anguiano



   1996  [id number]    









 PayID: [id number]  [address]









 [place] [address]









 [facility]    Policy Number: [id number]  [facility]  Matti Anguiano









 PayID: [id number]  [address]









 [place] [address]







Problems







 Date  Description  Provider  Status

 

 Onset: 10/16/2014  Atrial fibrillation  Jagjit Peck M.D., Located [facility],  Active



     [facility]  

 

 Onset: 10/16/2014  Essential hypertension  Jagjit Peck M.D., Located within [facility],  Active



     [facility]  

 

 Onset: 10/30/2014  Difficulty breathing  Jagjit Peck M.D., Loc[facility],  Active



     [facility]  

 

 Onset: 2014  Dyspnea  Jagjit Peck M.D., Located within [facility],  Active



     [facility]  

 

 Onset: 2015  Benign essential hypertension  Jagjit Peck M.D., Loc[facility],  
Active



     [facility]  

 

 Onset: 2015  Gastroesophageal reflux disease  Amber Bailey MD  Active

 

 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



   asthmaticus    

 

 Onset: 06/10/2015  Disturbance in sleep behavior  Amber Bailey MD  Active

 

 Onset: 10/01/2015  Obstructive sleep apnea syndrome  Amber Bailey MD  Active

 

 Onset: 2016  Chronic atrial fibrillation  Jagjit Peck M.D., Located [facility],  
Active



     [facility]  

 

 Onset: 2016  Right bundle branch block  Jagjit Peck M.D., Located within [facility],  Active



     [facility]  

 

 Onset: 2018  Minimal cognitive impairment  Sebastian Lizama M.D.  Active







Family History







 Date  Family Member(s)  Problem(s)  Comments

 

   General  Maternal side w/cancer  

 

   Father   of pneumonia  

 

   Mother  "Near" Alzheimer's  

 

   Siblings  1  

 

   Siblings  sister doing well  







Social History







 Type  Date  Description  Comments

 

 Marital Status    Single  

 

 Lives With    Alone  

 

 Occupation    Retired  

 

 ETOH Use    Occasionally consumes alcohol  1-2 drinks a day

 

 Smoking    Patient has never smoked  

 

 Recreational Drug Use    Denies Drug Use  

 

 Daily Caffeine    Consumes on average 1 cup of regular  



     coffee per day  

 

 Exercise Type/Frequency    Exercises sporadically  







Allergies, Adverse Reactions, Alerts







 Date  Description  Reaction  Status  Severity  Comments

 

 10/16/2014  NKDA    active    







Medications







 Medication  Date  Status  Form  Strength  Qnty  SIG  Indications  Ordering



                 Provider

 

 Donepezil HCL    Active  Tablets  10mg    1 by mouth    Unknown



   /2015          every day    

 

 Diltiazem CD  06/10  Active  Caps ER  240mg  90cap  1 tab by        24HR    s  mouth    Stefek,



             every day    M.DLucero,



                 Located within [facility],



                 [facility]

 

 Pradaxa    Active  Capsules  150mg  180ca  1 cap by    Unknown



   /0000        ps  mouth    



             twice a    



             day On    



             Hold as of    



             3/23/17    

 

 Glucosamine    Active  Capsules      3-4 caps    Unknown



 Chondroitin  /          po daily    



 Complex                

 

 Metamucil    Active        daily    Unknown



   /              

 

 Omeprazole    Active  Capsules DR  20mg    1 by mouth    Unknown



   /          every day    

 

 Tacrolimus    Active  Ointment      as needed    Unknown



   /              

 

 Memantine HCL    Active  Tablets  5mg    take 2    Unknown



   /0000          tablets by    



             mouth once    



             daily    

 

 Ra Vitamin D-3    Active  Capsules  2000Unit    take 1    Unknown



   /0000          capsule by    



             mouth once    



             daily    

 

 Furosemide    Active  Tablets  20mg  30tab  1/2 tab by    Jagjit



   /        s  mouth qod(    Liv,



             per    M.DLucero,



             3/21/16    Located within [facility],



             cardiology    [facility]



             note)    

 

 Cpap  00  Active  Device      for use    Unknown



   /0000          while    



             sleeping    

 

 Vitamin B12    Active  Tablets ER  1000mcg    1 by mouth    Unknown



   /0000          every day    

 

 Trelegy Ellipta    Active  Aerosol  100-62.5-    1 puff    Unknown



   /      25mcg/Inh    daily    

 

                 

 

 Fluticasone    Hx  Suspension  50mcg/Act    2 sprays    Unknown



 Propionate  /2017          each    



   -          nostril    



             daily as    



             needed    

 

 Vitamin D    Hx  Capsules  125mg  30cap  1 Tab per    Amber



           s  week    Lynn,



   -              MD



                 

 

 Albuterol Sulfate  06/10  Hx  Nebulizer  (2.5mg/3M  2unit  1 unit  J45.20  
      L) 0.083%  s  nebl every    Lynn,



   -          6 hours as    MD



             needed    



                 

 

 Advair HFA    Hx  Aerosol  115-21mcg  24uni  2 puff  J45.20  Amber



   /      /Act  ts  twice a    Lynn,



   -          day    MD



                 

 

 Albuterol Sulfate    Hx  Tablets  2mg    2 puffs    Unknown



   /2015          four times    



   -          a day as    



   06/10          needed    



                 

 

 Vitamin B12    Hx  Tablets  100mcg    1 by mouth    Unknown



   /2015          every day    



   -              



                 

 

 Nexium    Hx  Capsules DR  20mg    1 by mouth    Unknown



   /2015          every day    



   -              



                 

 

 Metoprolol    Hx  Tablets  50mg    1 by mouth    Jagjit



 Tartrate  /          twice a    Stefek,



   -          day    M.D.,



                 Located within [facility]              [facility]

 

 Metoprolol    Hx  Tablets ER  50mg  180ta  1 by mouth    Jagjit



 Succinate ER      24HR    bs  twice a    Stefek,



   -          day    M.D.,



   06/10              Located within [facility]              [facility]

 

 Metoprolol    Hx  Tablets ER  50mg  135ta  1 tablet    Jagjit



 Succinate ER      24HR    bs  in the    Stefek,



   -          morning    M.D.,



             12 tablet    Located within [facility]          in the    [facility]



             evening    

 

 Aspirin  00  Hx  Tablets DR  325mg  30tab  1/4 tab by    Unknown



   /0000        s  mouth    



   -          every day    



                 

 

 Valsartan    Hx  Tablets  160mg  90tab  2 tabs by    Unknown



   /0000        s  mouth    



   -          every day    



                 

 

 Furosemide  00/  Hx    20mg    1/2 tab po    Unknown



   /          daily    



   -              



                 

 

 Centrum Silver  00  Hx  Tablets      daily    Unknown



   /0000              



   -              



                 

 

 Phosphatidylserin    Hx  Capsules  100mg    3 caps    Unknown



 e  /0000          occasional    



   -          ly    



                 

 

 Cartia XT    Hx        1 by mouth    Unknown



   /          every day    



   -              



                 

 

 Vitamin D-3    Hx  Capsules  1000Unit    2by mouth    Unknown



   /0000          every day    



   -              



   04/15              



   /2018              

 

 Ferrous Sulfate  00  Hx  Liquid      take one    Unknown



   /          tsp PO    



   -          daily    



                 

 

 Vitamin C    Hx        daily    Unknown



   /              



   -              



                 

 

 Ferrous Sulfate  00  Hx  Elixir  220(44Fe)        Unknown



   /0000      mg/5ML        



   -              



                 







Immunizations







 CPT Code  Status  Date  Vaccine  Lot #

 

   Given  2015  Fluvirin Im 3Yrs And Older  







Vital Signs







 Date  Vital  Result  Comment

 

 2018  Height  73.5 inches  6'1.50"









 Weight  207.38 lb  

 

 Heart Rate  74 /min  

 

 BP Systolic Sitting  98 mmHg  Lue large cuff

 

 BP Diastolic Sitting  68 mmHg  Lue large cuff

 

 Respiratory Rate  18 /min  

 

 O2 % BldC Oximetry  94 %  On Ra

 

 BMI (Body Mass Index)  27.0 kg/m2  









 08/10/2018  Height  73.5 inches  6'1.50"









 Weight  210.00 lb  

 

 Heart Rate  88 /min  

 

 BP Systolic Sitting  120 mmHg  

 

 BP Diastolic Sitting  62 mmHg  

 

 Respiratory Rate  16 /min  

 

 BMI (Body Mass Index)  27.3 kg/m2  









 2018  Height  73.5 inches  6'1.50"









 Weight  212.00 lb  w/shoes

 

 Heart Rate  82 /min  

 

 BP Systolic Sitting  122 mmHg  lue reg cuff

 

 BP Diastolic Sitting  70 mmHg  lue reg cuff

 

 BP Systolic Standing  118 mmHg  lue reg cuff

 

 BP Diastolic Standing  70 mmHg  lue reg cuff

 

 Respiratory Rate  18 /min  

 

 BMI (Body Mass Index)  27.6 kg/m2  

 

 Ejection Fraction    echo 2014  Height  73.5 inches  6'1.50"









 Weight  220.00 lb  

 

 Heart Rate  72 /min  reg/irreg

 

 BP Systolic Sitting  110 mmHg  

 

 BP Diastolic Sitting  68 mmHg  

 

 Respiratory Rate  16 /min  

 

 BMI (Body Mass Index)  28.6 kg/m2  









 2017  Height  73 inches  6'1"









 Weight  220.00 lb  

 

 Heart Rate  80 /min  

 

 BP Systolic Sitting  114 mmHg  

 

 BP Diastolic Sitting  64 mmHg  

 

 Respiratory Rate  14 /min  

 

 O2 % BldC Oximetry  97 %  

 

 BMI (Body Mass Index)  29.0 kg/m2  









 2017  Height  73 inches  6'1"









 Weight  211.00 lb  w/ shoes

 

 Heart Rate  90 /min  irreg

 

 BP Systolic Sitting  100 mmHg  Rue, reg cuff

 

 BP Diastolic Sitting  70 mmHg  Rue, reg cuff

 

 BP Systolic Standing  110 mmHg  Rue

 

 BP Diastolic Standing  64 mmHg  Rue

 

 Respiratory Rate  16 /min  

 

 BMI (Body Mass Index)  27.8 kg/m2  

 

 Ejection Fraction  60-65%  as of 14 echo









 2017  Height  73 inches  6'1"









 Weight  223.00 lb  

 

 Heart Rate  65 /min  

 

 BP Systolic  120 mmHg  

 

 BP Diastolic  70 mmHg  

 

 Respiratory Rate  14 /min  

 

 O2 % BldC Oximetry  96 %  

 

 BMI (Body Mass Index)  29.4 kg/m2  









 2016  Height  73 inches  6'1"









 Weight  223.00 lb  

 

 Heart Rate  80 /min  82

 

 BP Systolic Sitting  120 mmHg  right arm, reg cuff

 

 BP Diastolic Sitting  70 mmHg  right arm, reg cuff

 

 BP Systolic Standing  132 mmHg  right arm, reg cuff

 

 BP Diastolic Standing  76 mmHg  right arm, reg cuff

 

 Respiratory Rate  16 /min  

 

 BMI (Body Mass Index)  29.4 kg/m2  

 

 Ejection Fraction  60-65%  14  Height  73 inches  6'1"









 Heart Rate  83 /min  

 

 BP Systolic  120 mmHg  

 

 BP Diastolic  80 mmHg  

 

 Respiratory Rate  14 /min  

 

 O2 % BldC Oximetry  96 %  









 10/01/2015  Heart Rate  91 /min  









 BP Systolic Sitting  136 mmHg  

 

 BP Diastolic Sitting  74 mmHg  

 

 Respiratory Rate  22 /min  

 

 O2 % BldC Oximetry  94 %  









 2015  Height  73.5 inches  6'1.50"









 Weight  220.00 lb  

 

 Pain Level  0  

 

 BMI (Body Mass Index)  28.6 kg/m2  









 2015  Height  73.5 inches  6'1.50"









 Weight  220.00 lb  

 

 Pain Level  3  

 

 BMI (Body Mass Index)  28.6 kg/m2  









 2015  Height  73.5 inches  6'1.50"









 Weight  220.00 lb  

 

 Pain Level  5  

 

 BMI (Body Mass Index)  28.6 kg/m2  









 07/15/2015  Height  73.5 inches  6'1.50"









 Weight  220.00 lb  

 

 Heart Rate  76 /min  80

 

 BP Systolic Sitting  110 mmHg  left arm, reg cuff

 

 BP Diastolic Sitting  74 mmHg  left arm, reg cuff

 

 BP Systolic Standing  108 mmHg  left arm, reg cuff

 

 BP Diastolic Standing  68 mmHg  left arm, reg cuff

 

 Respiratory Rate  16 /min  

 

 BMI (Body Mass Index)  28.6 kg/m2  

 

 Ejection Fraction  60-65%  14  Heart Rate  76 /min  80









 BP Systolic Sitting  114 mmHg  left arm, reg cuff

 

 BP Diastolic Sitting  70 mmHg  left arm, reg cuff

 

 BP Systolic Standing  102 mmHg  left arm, reg cuff

 

 BP Diastolic Standing  64 mmHg  left arm, reg cuff

 

 Respiratory Rate  16 /min  









 06/10/2015  Heart Rate  74 /min  









 BP Systolic Sitting  130 mmHg  

 

 BP Diastolic Sitting  64 mmHg  

 

 Respiratory Rate  20 /min  

 

 O2 % BldC Oximetry  96 %  









 2015  Heart Rate  85 /min  









 BP Systolic Sitting  132 mmHg  

 

 BP Diastolic Sitting  66 mmHg  

 

 Respiratory Rate  20 /min  

 

 O2 % BldC Oximetry  95 %  









 2015  Height  73.5 inches  6'1.50"









 Weight  214.00 lb  

 

 Pain Level  1  

 

 BMI (Body Mass Index)  27.8 kg/m2  









 2015  Height  73.5 inches  6'1.50"









 Weight  220.38 lb  

 

 Heart Rate  80 /min  

 

 BP Systolic Sitting  132 mmHg  

 

 BP Diastolic Sitting  74 mmHg  

 

 Respiratory Rate  20 /min  

 

 Body Temperature  98.0 F  

 

 O2 % BldC Oximetry  94 %  

 

 BMI (Body Mass Index)  28.7 kg/m2  

 

 Neck Circumference in inches  16  









 2015  Height  73.5 inches  6'1.50"









 Weight  218.00 lb  

 

 Heart Rate  76 /min  82

 

 BP Systolic Sitting  140 mmHg  right arm, reg cuff

 

 BP Diastolic Sitting  86 mmHg  right arm, reg cuff

 

 BP Systolic Standing  124 mmHg  right arm, reg cuff

 

 BP Diastolic Standing  88 mmHg  right arm, reg cuff

 

 Respiratory Rate  20 /min  

 

 BMI (Body Mass Index)  28.4 kg/m2  









 2015  Height  73.5 inches  6'1.50"









 Weight  214.00 lb  

 

 Pain Level  2  

 

 BMI (Body Mass Index)  27.8 kg/m2  









 2015  Height  73.5 inches  6'1.50"









 Weight  211.00 lb  

 

 Heart Rate  72 /min  74

 

 BP Systolic Sitting  120 mmHg  right arm, reg cuff

 

 BP Diastolic Sitting  84 mmHg  right arm, reg cuff

 

 BP Systolic Standing  118 mmHg  right arm, reg cuff

 

 BP Diastolic Standing  84 mmHg  right arm, reg cuff

 

 Respiratory Rate  20 /min  

 

 BMI (Body Mass Index)  27.5 kg/m2  









 2015  Height  73.5 inches  6'1.50"









 Weight  214.00 lb  

 

 BMI (Body Mass Index)  27.8 kg/m2  









 2014  Height  73.5 inches  6'1.50"









 Weight  222.00 lb  

 

 Heart Rate  80 /min  84

 

 BP Systolic Sitting  124 mmHg  left arm, reg cuff

 

 BP Diastolic Sitting  80 mmHg  left arm, reg cuff

 

 BP Systolic Standing  98 mmHg  left arm, reg cuff

 

 BP Diastolic Standing  74 mmHg  left arm, reg cuff

 

 Respiratory Rate  20 /min  

 

 BMI (Body Mass Index)  28.9 kg/m2  









 10/30/2014  Height  73.5 inches  6'1.50"









 Weight  223.00 lb  

 

 Heart Rate  64 /min  80

 

 BP Systolic Sitting  120 mmHg  left arm, reg cuff

 

 BP Diastolic Sitting  72 mmHg  left arm, reg cuff

 

 BP Systolic Standing  114 mmHg  leftarm, reg cuff

 

 BP Diastolic Standing  70 mmHg  leftarm, reg cuff

 

 Respiratory Rate  16 /min  

 

 BMI (Body Mass Index)  29.0 kg/m2  









 10/16/2014  Height  73.5 inches  6'1.50"









 Weight  220.00 lb  

 

 Heart Rate  74 /min  78

 

 BP Systolic Sitting  106 mmHg  right arm, reg cuff

 

 BP Diastolic Sitting  66 mmHg  right arm, reg cuff

 

 BP Systolic Standing  112 mmHg  right arm, reg cuff

 

 BP Diastolic Standing  66 mmHg  right arm, reg cuff

 

 Respiratory Rate  14 /min  

 

 BMI (Body Mass Index)  28.6 kg/m2  







Results







 Test  Date  Test  Result  H/L  Range  Note

 

 Laboratory test  2017  Packed Cells  SEE RESULTS BELO      1



 finding      <SEE NOTE>      

 

 Type & Screen  2017  Patient Blood Type  B Positive      









 Antibody Screen  NEGATIVE      









 Cell Morphology  2017  Macrocytosis  1+      









 Microcytosis  1+      

 

 Hypochromasia  3+      

 

 Polychromasia  1+      









 Laboratory test finding  2017  Troponin-I (TnI)  0.02 ng/mL    <0.04  2









 Lactic Acid  2.1 mmol/L  High  0.5-2.0  3

 

 B-Type Natriuretic Peptide BNP  126 pg/mL  High    4









 CBC Auto Diff  2017  White Blood Count  8.1 10^3/uL    3.5-10.8  









 Red Blood Count  2.30 10^6/uL  Low  4.0-5.4  

 

 Hemoglobin  5.8 g/dL  Low  14.0-18.0  5

 

 Hematocrit  19 %  Low  42-52  

 

 Mean Corpuscular Volume  82 fL    80-94  

 

 Mean Corpuscular Hemoglobin  25 pg  Low  27-31  

 

 Mean Corpuscular HGB Conc  31 g/dL    31-36  

 

 Red Cell Distribution Width  22 %  High  10.5-15  

 

 Platelet Count  312 10^3/uL    150-450  

 

 Mean Platelet Volume  7 um3  Low  7.4-10.4  

 

 Abs Neutrophils  6.0 10^3/uL    1.5-7.7  

 

 Abs Lymphocytes  1.3 10^3/uL    1.0-4.8  

 

 Abs Monocytes  0.7 10^3/uL    0-0.8  

 

 Abs Eosinophils  0.1 10^3/uL    0-0.6  

 

 Abs Basophils  0 10^3/uL    0-0.2  

 

 Abs Nucleated RBC  0.03 10^3/uL      

 

 Granulocyte %  74.0 %    38-83  

 

 Lymphocyte %  16.4 %  Low  25-47  

 

 Monocyte %  8.1 %    1-9  

 

 Eosinophil %  1.2 %    0-6  

 

 Basophil %  0.3 %    0-2  

 

 Nucleated Red Blood Cells %  0.4      









 Comp Metabolic Panel  2017  Sodium  134 mmol/L    133-145  









 Potassium  3.9 mmol/L    3.5-5.0  

 

 Chloride  104 mmol/L    101-111  

 

 Co2 Carbon Dioxide  24 mmol/L    22-32  

 

 Anion Gap  6 mmol/L    2-11  

 

 Glucose  101 mg/dL  High    

 

 Blood Urea Nitrogen  17 mg/dL    6-24  

 

 Creatinine  1.17 mg/dL    0.67-1.17  

 

 BUN/Creatinine Ratio  14.5    8-20  

 

 Calcium  8.8 mg/dL    8.6-10.3  

 

 Total Protein  6.1 g/dL  Low  6.4-8.9  

 

 Albumin  3.6 g/dL    3.2-5.2  

 

 Globulin  2.5 g/dL    2-4  

 

 Albumin/Globulin Ratio  1.4    1-3  

 

 Total Bilirubin  0.40 mg/dL    0.2-1.0  

 

 Alkaline Phosphatase  54 U/L      

 

 Alt  13 U/L    7-52  

 

 Ast  13 U/L    13-39  

 

 Egfr Non-  59.0    >60  

 

 Egfr   75.8    >60  6









 Basic Metabolic Panel  2015  Potassium  3.8 mmol/L    3.5-5.0  









 Chloride  103 mmol/L    101-111  

 

 Co2 Carbon Dioxide  29 mmol/L    22-32  

 

 Glucose  112 mg/dL  High    

 

 Blood Urea Nitrogen  8 mg/dL    6-24  

 

 Creatinine  1.06 mg/dL    0.67-1.17  

 

 BUN/Creatinine Ratio  7.5  Low  8-20  

 

 Calcium  9.4 mg/dL    8.6-10.3  

 

 Egfr Non-  66.2    >60  

 

 Egfr   85.2    >60  7

 

 Sodium  138 mmol/L    133-145  

 

 Anion Gap  6 mmol/L    2-11  









 Laboratory test finding  2015  B Type Natriuretic Peptide  164 pg/mL    
  8

 

 Basic Metabolic Panel  2015  Sodium  136 mmol/L    133-145  









 Potassium  4.6 mmol/L    3.5-5.0  

 

 Chloride  103 mmol/L    101-111  

 

 Co2 Carbon Dioxide  30 mmol/L    22-32  

 

 Anion Gap  3 mmol/L    2-11  

 

 Glucose  89 mg/dL      

 

 Blood Urea Nitrogen  17 mg/dL    6-24  

 

 Creatinine  1.20 mg/dL  High  0.67-1.17  

 

 BUN/Creatinine Ratio  14.2    8-20  

 

 Calcium  9.4 mg/dL    8.6-10.3  

 

 Egfr Non-  57.5    >60  

 

 Egfr   74.0    >60  9









 Arterial Blood Gas  12/10/2014  PH Arterial  7.31  Low  7.35-7.45  









 Pco2 Arterial  47 mmHg  High  35-45  

 

 Po2 Arterial  52 mmHg  Low    10

 

 O2 Saturation Arterial  91.9 %  Low  95-98  

 

 Base Excess Arterial  -3.0  Low  -2.0-2.0  11

 

 Hco3 Arterial  22.3 mmol/L    19-31  









 1  SEE RESULTS BELOW



   [id number]  BN        PC



   TRANSFUSED     17 0340



   [id number]  BP        PC



   TRANSFUSED     17 2322



   [id number]  BP        PC



   TRANSFUSED     17 1937

 

 2  99th percentile=0.04 ng/mL



   



   Troponin results at [facility] and [facility] are not interchangeable.

 

 3  Critical Result LACT:2.1 Called to [id number] at: 21:59:33 by:[email or web address] Read back



   by:[email or web address]



   [facility] Severe Sepsis and Septic Shock Management Bundle Measure



   requires all lactic acids initially measuring >2.0 mmol/L be



   repeated.

 

 4  >100 to <200 pg/mL: likely compensated congestive heart



   failure (CHF)



   200 to 400 pg/mL: likely moderate CHF



   >400 pg/mL: likely moderate to severe CHF

 

 5  Verbal to [id number] by [id number] at 2152 on 17.



   Results read back accurately.

 

 6  *******Because ethnic data is not always readily available,



   this report includes an eGFR for both -Americans and



   non- Americans.****



   The National Kidney Disease Education Program (NKDEP) does



   not endorse the use of the MDRD equation for patients that



   are not between the ages of 18 and 70, are pregnant, have



   extremes of body size, muscle mass, or nutritional status,



   or are non- or non-.



   According to the National Kidney Foundation, irrespective of



   diagnosis, the stage of the disease is based on the level of



   kidney function:



   Stage Description                      GFR(mL/min/1.73 m(2))



   1     Kidney damage with normal or decreased GFR       90



   2     Kidney damage with mild decrease in GFR          60-89



   3     Moderate decrease in GFR                         30-59



   4     Severe decrease in GFR                           15-29



   5     Kidney failure                       <15 (or dialysis)

 

 7  *******Because ethnic data is not always readily available,



   this report includes an eGFR for both -Americans and



   non- Americans.****



   The National Kidney Disease Education Program (NKDEP) does



   not endorse the use of the MDRD equation for patients that



   are not between the ages of 18 and 70, are pregnant, have



   extremes of body size, muscle mass, or nutritional status,



   or are non- or non-.



   According to the National Kidney Foundation, irrespective of



   diagnosis, the stage of the disease is based on the level of



   kidney function:



   Stage Description                      GFR(mL/min/1.73 m(2))



   1     Kidney damage with normal or decreased GFR       90



   2     Kidney damage with mild decrease in GFR          60-89



   3     Moderate decrease in GFR                         30-59



   4     Severe decrease in GFR                           15-29



   5     Kidney failure                       <15 (or dialysis)

 

 8  >100 to <200 pg/mL: likely compensated congestive heart



   failure (CHF)



   200 to 400 pg/mL: likely moderate CHF



   >400 pg/mL: likely moderate to severe CHF



   [facility]

 

 9  *******Because ethnic data is not always readily available,



   this report includes an eGFR for both -Americans and



   non- Americans.****



   The National Kidney Disease Education Program (NKDEP) does



   not endorse the use of the MDRD equation for patients that



   are not between the ages of 18 and 70, are pregnant, have



   extremes of body size, muscle mass, or nutritional status,



   or are non- or non-.



   According to the National Kidney Foundation, irrespective of



   diagnosis, the stage of the disease is based on the level of



   kidney function:



   Stage Description                      GFR(mL/min/1.73 m(2))



   1     Kidney damage with normal or decreased GFR       90



   2     Kidney damage with mild decrease in GFR          60-89



   3     Moderate decrease in GFR                         30-59



   4     Severe decrease in GFR                           15-29



   5     Kidney failure                       <15 (or dialysis)

 

 10  Verbal to [id number] by [id number] at 1812 on 12/10/14.



   Results read back accurately.

 

 11  Reference ranges based on room air.







Procedures







 Date  CPT Code  Description  Status

 

 2018  53449  EKG Tracing & Interpretation  Completed

 

 2017  73143  EKG Tracing & Interpretation  Completed

 

 2016  26975  EKG Tracing & Interpretation  Completed

 

 09/10/2015  24665  Polysomnography Sleep Staging 4+ Parameters  Completed

 

 2015  95398  CLST TRMT Distal Radial FX  Completed

 

 2015  63612  EKG Tracing & Interpretation  Completed

 

 2015  93320  Diffusing Capacity  Completed

 

 2015  39896  Plethysmography Determination Lung Volumes & Per Airway  
Completed



     Resist  

 

 2015  20520  Pulmonary Stress Test Simple  Completed

 

 2015  15642  Pulmonary Function><Bronchodil  Completed

 

 2015  04637  EKG Tracing & Interpretation  Completed

 

 2015  03775  Rad Exam; Pelvis  Completed

 

 2015  32463  Rad Exam; Pelvis  Completed

 

 2015  36965  Rad Exam; Hip Unilat  Completed

 

 2015  93488  Rad Exam; Hip Unilat  Completed

 

 2014  54686  Percutaneous TX Of Femoral FX  Completed

 

 2014  22099  Percutaneous TX Of Femoral FX  Completed

 

 2014  38193  ECHO Transthorasic Realtime 2D W Doppler & Color Flow  
Completed



     Hosp  

 

 2014  02795  EKG, Interpretation Only  Completed

 

 2014  33342  Treadmill Interp/Report Only  Completed

 

 2014  17760  Stress Test Supervsn W/Out I/R  Completed

 

 10/24/2014  55069  ECHO Transthorasic Realtime 2D W Doppler & Color Flow  
Completed



     [facility]  

 

 10/16/2014  30895  EKG Tracing & Interpretation  Completed

 

 2013  15499  EKG Tracing & Interpretation  Completed







Encounters







 Type  Date  Location  Provider  CPT E/M  Dx

 

 Office Visit  2018  [facility]  Amber Bailey MD  18642  
J45.20



   10:45a  [facility]      









 G47.33

 

 J90









 Office Visit  08/10/2018  3:15p  [facility]  Sebastian Dl,  51080  
G31.84



     [facility]  M.D.    

 

 Office Visit  2018 11:20a  [facility]  Jagjit Peck M.D.,  
66230  I48.2



     [facility] AT [facility]    









 I45.10









 Office Visit  2018  1:00p  [facility]  Dennisalex Dl,  07099  
G31.84



     [facility]  M.D.    

 

 Office Visit  2017  9:45a  [facility]  Amber Bailey MD  
89154  J45.20



     [facility]      









 G47.33









 Office Visit  2017  3:20p  [facility]  Jagjit Peck M.D.,  
57031  I48.2



     [facility] AT [facility]    









 K92.2









 Office Visit  2017 10:40a  [facility],  Mike Orozco,  
66268  K92.2



     Hospitalists  M.D.    









 G47.33

 

 D62









 Office Visit  2017 10:40a  [facility],  Mike Orozco,  
17057  K92.2



     Hospitalists  MISABELLA    









 G47.33

 

 D62









 Office Visit  2017 10:39a  [facility],  Jacobo Sandoval M.D.  
67553  K92.2



     Hospitalists      









 D62

 

 G47.33









 Office Visit  2017  9:00a  [facility]  Amber Bailey MD  
52563  J45.20



     [facility]      









 G47.33









 Office Visit  2016  1:20p  [facility]  Jagjit Peck M.D.,  
58263  I48.2



     [facility] AT [facility]    









 I45.10









 Office Visit  2015  3:30p  [facility]  Amber Bailey MD  
76672  G47.33



     [facility]      









 J45.20









 Office Visit  10/01/2015 10:45a  [facility]  Amber Bailey MD  
68599  G47.33



     [facility]      









 J45.20









 Office Visit  07/15/2015  2:40p  [facility] Grzegorz Peck M.D.,  
94190  427.31



     [facility]    

 

 Office Visit  2015  1:45p  [facility]  Nurse Visit IC  68937  
427.31



     [facility] AT [facility]      

 

 Office Visit  06/10/2015 11:30a  [facility]  Amber Bailey MD  
20031  493.10



     [facility]      









 530.81

 

 780.50









 Office Visit  2015 10:15a  [facility]  Amber Bailey MD  
56748  493.10



     [facility]      









 786.05

 

 530.81









 Office Visit  2015 11:30a  [facility]  Vineet Saleem M.D.  
18154  V54.15



     [facility]      









 V67.4

 

 V15.51









 Office Visit  2015  9:45a  [facility]  Amber Bailey MD  
41335  786.09



     [facility]      









 530.81









 Office Visit  2015  3:20p  [facility]  Jagjit Peck M.D.,  
77475  427.31



     [facility] AT [facility]    









 401.1

 

 786.05









 Office Visit  2015  3:20p  [facility]  Jagjit Peck M.D.,  
79485  427.31



     [facility] AT [facility]    









 401.1

 

 786.05









 Office Visit  2014 10:49a  [facility]  Deonna Valente,  66990  428.31



     Assoc, Hospitalists  M.D.    









 427.31

 

 486

 

 820.8









 Office Visit  2014 10:48a  [facility] Yulisa Valente,  36591  428.31



     [facility] Hospital[facility]  M.D.    









 427.31

 

 486

 

 820.8









 Office Visit  2014 10:48a  [facility]  Deonna Valente,  27292  428.31



     [facility] Hospital[facility]  M.D.    









 427.31

 

 486

 

 820.8









 Office Visit  2014 12:44p  [facility]  Amber Bailey MD  
69046  786.05



     [facility]      

 

 Office Visit  2014 12:44p  [facility]  Amber Bailey MD  
26349  786.05



     [facility]      

 

 Office Visit  2014 10:47a  [facility]  Deonna Valente,  49433  428.31



     [facility] Hospital[facility]  M.D.    









 427.31

 

 820.8

 

 486









 Office Visit  2014  3:15p  [facility]  Larisa Ruano M.D.  
87465  427.31



     [facility]      









 786.05









 Office Visit  2014 10:47a  [facility]  Deonna Burketthn,  04339  428.31



     [facility]  M.D.    









 427.31

 

 820.8

 

 486









 Office Visit  2014  1:20p  [facility]  Qutaybeh S. Maghaydah,  
49320  427.31



       M.D.    









 794.31

 

 401.1









 Office Visit  2014 10:47a  [facility]  Deonna Sidra,  65411  428.31



     [facility]  M.D.    









 427.31

 

 486

 

 820.8









 Office Visit  12/15/2014 12:33p  [facility]  Amber Bailey MD  
74989  786.05



     [facility]      

 

 Office Visit  12/15/2014  1:45p  [facility]  Jagjit Peck M.D.,  
98722  427.31



     [facility]    

 

 Office Visit  12/15/2014 10:46a  [facility]  Deonna Hohn,  04319  428.31



     [facility]  M.D.    









 427.31

 

 486

 

 820.8









 Office Visit  2014 12:32p  [facility]  Amber Bailey MD  
06862  786.05



     [facility]      

 

 Office Visit  2014  1:22p  [facility]   Artis Payton,  
57148  427.31



     Alaina ROSENBERG, FAC, [facility]    

 

 Office Visit  2014 10:46a  [facility]  Deonna Sidra,  48294  428.31



     [facility]  M.D.    









 427.31

 

 486

 

 820.8









 Office Visit  2014 12:24p  [facility]  Amber Bailey MD  
63632  786.05



     [facility]      

 

 Office Visit  2014  1:04p  [facility]  Artis Payton,  
35561  427.31



     Alaina ROSENBERG, FAC, [facility]    

 

 Office Visit  2014 10:45a  [facility],diana Gifford,  
04257  518.81



     Hospitalists  M.D.    









 584.9

 

 428.31

 

 820.8









 Office Visit  2014 10:44a  [facility]diana Gifford,  
68265  518.81



     Hospitalists  M.D.    









 428.31

 

 584.9

 

 820.8









 Office Visit  2014 10:45a  [facility],diana CLAROS  67126  
518.81



     Hospitalists  ZOILA Dong    









 584.9

 

 428.31

 

 820.8









 Office Visit  2014 12:24p  [facility]  Amber Bailey MD  
83891  786.05



     [facility]      

 

 Office Visit  2014 10:44a  [facility],diana CLAROS  04238  
518.81



     Hospitalists  ZOILA Dong    









 428.31

 

 584.9

 

 820.8









 Office Visit  2014 10:43a  [facility],diana Gifford,  
14555  518.81



     Hospitalists  M.D.    









 428.31

 

 584.9

 

 820.8









 Office Visit  2014  2:07p  [facility]  Jagjit Peck M.D.,  
29863  427.31



     [facility]    

 

 Office Visit  2014 12:22p  [facility]  Amber Bailey MD  
36745  786.05



     [facility]      

 

 Office Visit  2014  2:54p  [facility]  Jagjit Peck M.D.,  
15774  427.31



     [facility] AT [facility]    









 458.9









 Office Visit  12/10/2014 10:42a  [facility],  78168  
427.31



     [facility]oc[facility]  [place]    









 486

 

 820.8

 

 401.9









 Office Visit  12/10/2014  7:00a  [facility]  Vineet Saleem M.D.  
80133  820.8



     [facility]      

 

 Office Visit  2014  3:40p  [facility] Grzegorz Peck M.D.,  
48497  427.31



     [facility] AT [facility]    









 786.05

 

 401.9









 Office Visit  10/30/2014  2:20p  [facility] Grzegorz Peck M.D.,  
27291  427.31



     [facility] AT [facility]    









 401.9

 

 786.09









 Office Visit  10/16/2014  3:20p  [facility]  Jagjit Peck M.D.,  
32875  427.31



     [facility] AT [facility]    









 401.9









 Office Visit  2013 12:45p  [facility]  Jagjit Peck M.D.,  
24275  401.9



     [facility]    









 427.9







Plan of Care

Future Appointment(s):2019 10:45 am - Amber Bailey MD at [facility]2019  3:00 pm - Sebastian Lizama M.D. at [facility]2018 - Amber Bailey MDJ45.20 Mild 
intermittent asthma, uncomplicatedFollow up:6 vqpltxZ89.33 Obstructive sleep 
apnea (adult) (pediatric)J90 Pleural effusion, not elsewhere classified

## 2018-09-18 NOTE — XMS REPORT
[facility] - Continuity of Care Document

 Created on:2018



Patient:RAD SMART

Sex:Male

:1929

External Reference #:[id number]





Demographics







 Address  [address] [place] [address]

 

 Home Phone  [phone]

 

 Preferred Language  en

 

 Marital Status  Not  or 

 

 Uatsdin Affiliation  Unknown

 

 Race  White

 

 Ethnic Group  Not  or 









Author







 Organization  [facility]









Care Team Providers







 Name  Role  Phone

 

 YONATAN BRUNNER  Primary Care Physician  [phone]









Allergies and Intolerances

No Known Allergies



Medications







 RxNorm  Medication  Dose  Route  Instructions  Start  End Date  Status



           Date    

 

 2418  Cholecalciferol  2000 unit  oral  orally every day      Active

 

 3800850  dabigatran etexilate  150 mg  oral  orally 2 times per      Active



   150 MG Oral Capsule      day (administer at      



         approximately same      



         time(s) each day)      

 

   Diltiazem Oral 24 hr  240 mg  oral  orally every day      Active



   Cap            

 

   Doxycycline Hyclate  100 mg  oral  orally 2 times per      Active



   Oral      day (5 days)      

 

 982615  Furosemide 20 MG  20 mg  oral  orally every day      Active



   Oral Tablet            

 

 207776  Levofloxacin 500 MG  500 mg  oral  orally every day      Active



   Oral Tablet      (5 days)      

 

 816222  Memantine  5 mg  oral  orally 2 times per      Active



   hydrochloride 5 MG      day      



   Oral Tablet            

 

 7646  Omeprazole  20 mg  oral  orally every day      Active







Problems







 Code  Code System  Problem Name  Start Date  End Date  Status

 

 82235959  SNOMED-CT  Pneumothorax      Active







Procedures

No data in the system



Results

No data in the system



Social History







 Code  Code System  Social History  Description  Dates Observed



     Observation    

 

 942239182  SNOMED CT  Current Smoking  Unknown if ever  



     Status  smoked  

 

 UNK  AdministrativeGender  Sex Assigned At  Unknown  



     Birth    







Vital Signs

No data in the system



Goals Section

No data in the system



Health Concerns

No data in the systemEncounter Diagnosis









 Date  Code  Code System  Diagnosis  Status

 

   R04.2  ICD10  HEMOPTYSIS  Active







Advance Directives

PT STATES NO ADVANCE DIRECTIVES





 Directive Type  Effective Date    Notes  Supporting Document









 Name  Address  Phone









 No Directive Type  2018 8:31:00  Not Specified  Not Specified  Not 
Specified  None  No



 specified  PM          



*RHIO - CONSENT IS YES





 Directive Type  Effective Date    Notes  Supporting Document









 Name  Address  Phone









 No Directive Type  2012  Not Specified  Not Specified  Not Specified  
None  No



 specified  10:01:04 PM          







Family History

No data in the system



Functional Status

No data in the system



Immunizations

No data in the system



Medical Equipment

No data in the system



Mental Status

No data in the system



Assessment and Plan

Assessments

No data in the systemPlan Of Treatment

No data in the systemPending Tests

No data in the system



Hospital Discharge Instructions

No data in the system



Reason for Visit

No data in the system

## 2018-09-18 NOTE — XMS REPORT
[facility] - Continuity of Care Document

 Created on:2018



Patient:RAD SMART

Sex:Male

:1929

External Reference #:[id number]





Demographics







 Address  [address] [place] [address]

 

 Home Phone  [phone]

 

 Preferred Language  en

 

 Marital Status  Not  or 

 

 Pentecostalism Affiliation  Unknown

 

 Race  White

 

 Ethnic Group  Not  or 









Author







 Organization  [facility]









Care Team Providers







 Name  Role  Phone

 

 YONATAN BRUNNER  Primary Care Physician  [phone]









Allergies and Intolerances

No Known Allergies



Medications







 RxNorm  Medication  Dose  Route  Instructions  Start  End Date  Status



           Date    

 

 2418  Cholecalciferol  2000 unit  oral  orally every day      Active

 

 2777698  dabigatran etexilate  150 mg  oral  orally 2 times per      Active



   150 MG Oral Capsule      day (administer at      



         approximately same      



         time(s) each day)      

 

   Diltiazem Oral 24 hr  240 mg  oral  orally every day      Active



   Cap            

 

   Doxycycline Hyclate  100 mg  oral  orally 2 times per      Active



   Oral      day (5 days)      

 

 714507  Furosemide 20 MG  20 mg  oral  orally every day      Active



   Oral Tablet            

 

 129269  Levofloxacin 500 MG  500 mg  oral  orally every day      Active



   Oral Tablet      (5 days)      

 

 810284  Memantine  5 mg  oral  orally 2 times per      Active



   hydrochloride 5 MG      day      



   Oral Tablet            

 

 7646  Omeprazole  20 mg  oral  orally every day      Active







Problems







 Code  Code System  Problem Name  Start Date  End Date  Status

 

 50340561  SNOMED-CT  Pneumothorax      Active







Procedures

No data in the system



Results

No data in the system



Social History







 Code  Code System  Social History  Description  Dates Observed



     Observation    

 

 250236294  SNOMED CT  Current Smoking  Unknown if ever  



     Status  smoked  

 

 UNK  AdministrativeGender  Sex Assigned At  Unknown  



     Birth    







Vital Signs

No data in the system



Goals Section

No data in the system



Health Concerns

No data in the systemEncounter Diagnosis









 Date  Code  Code System  Diagnosis  Status

 

   R04.2  ICD10  HEMOPTYSIS  Active







Advance Directives

PT STATES NO ADVANCE DIRECTIVES





 Directive Type  Effective Date    Notes  Supporting Document









 Name  Address  Phone









 No Directive Type  2018 8:31:00  Not Specified  Not Specified  Not 
Specified  None  No



 specified  PM          



*RHIO - CONSENT IS YES





 Directive Type  Effective Date    Notes  Supporting Document









 Name  Address  Phone









 No Directive Type  2012  Not Specified  Not Specified  Not Specified  
None  No



 specified  10:01:04 PM          







Family History

No data in the system



Functional Status

No data in the system



Immunizations

No data in the system



Medical Equipment

No data in the system



Mental Status

No data in the system



Assessment and Plan

Assessments

No data in the systemPlan Of Treatment

No data in the systemPending Tests

No data in the system



Hospital Discharge Instructions

No data in the system



Reason for Visit

No data in the system

## 2018-09-18 NOTE — ED
Altered Mental Status





- HPI Summary


HPI Summary: 


Pt is an 88 y/o male BIBA who presents to the ED c/o AMS. He was SOB and was 

given fluids and oxygen by EMS. Pt states he feels fine now, and denies any 

current SOB or CP. He was unable to recall events, but now is alert and 

oriented. Pt states he was sitting on his porch and next thing he knows the 

ambulance showed up. Pt remembers going to his neighbors house to drink scotch

, and states he had a fair amount, but just enough. His neighbors called EMS 

due to his AMS. PMHx atrial arrhythmia, GERD, HTN, CHF, and arthritis.








- History Of Current Complaint


Chief Complaint: EDAltMentalStatus


Stated Complaint: CARDIAC ISSUES


Time Seen by Provider: 09/18/18 20:58


Hx Obtained From: Patient


Onset/Duration: Resolved


Severity Currently: None


Character: Confusion


Aggravating Factor(s): Other - Drinking scotch


Alleviating Factor(s): Oxygen, Other - Fluids


Associated Signs And Symptoms: Positive: Negative





- Allergies/Home Medications


Allergies/Adverse Reactions: 


 Allergies











Allergy/AdvReac Type Severity Reaction Status Date / Time


 


No Known Allergies Allergy   Verified 12/10/14 15:44














PMH/Surg Hx/FS Hx/Imm Hx


Endocrine/Hematology History: 


   Denies: Hx Diabetes


Cardiovascular History: Reports: Hx Atrial Fibrillation, Hx Congestive Heart 

Failure, Hx Hypertension


   Denies: Hx Angina


Respiratory History: Reports: Hx Sleep Apnea


   Denies: Hx Asthma, Hx Chronic Obstructive Pulmonary Disease (COPD)


   Comment Only: Other Respiratory Problems/Disorders - pt states wheezes 

occasionally


GI History: Reports: Hx Diverticulosis, Hx Gastroesophageal Reflux Disease


Musculoskeletal History: Reports: Hx Arthritis


Sensory History: Reports: Hx Contacts or Glasses


Opthamlomology History: Reports: Hx Contacts or Glasses





- Surgical History


Surgery Procedure, Year, and Place: Right hip (2013), Tonsilectomy


Infectious Disease History: No


Infectious Disease History: Reports: Hx Hepatitis - Hepatitis B


   Denies: Traveled Outside the [place] in Last 30 Days





- Family History


Known Family History: 


   Negative: Cardiac Disease, Hypertension, Diabetes





- Social History


Alcohol Use: Daily


Alcohol Amount: 3 MIXED DRINKS PER DAY


Hx Substance Use: No


Substance Use Type: Reports: None


Hx Tobacco Use: No


Smoking Status (MU): Never Smoked Tobacco





Review of Systems


Negative: Chest Pain


Positive: Shortness Of Breath


Positive: Other - Confusion


All Other Systems Reviewed And Are Negative: Yes





Physical Exam





- Summary


Physical Exam Summary: 


Appearance: Well appearing, no pain distress


Skin: warm, dry, reflects adequate perfusion


Head/face: normal


Eyes: EOMI, JASEN


ENT: mucous membranes moist


Neck: supple, non-tender


Respiratory: CTA, breath sounds present


Cardiovascular: irregularly irregular rhythm, pulses symmetrical, 2+ LE pitting 

edema


Abdomen: non-tender, soft


Bowel Sounds: present


Musculoskeletal: normal, strength/ROM intact


Neuro: normal, sensory motor intact, A&Ox3


GCS: 15





Triage Information Reviewed: Yes


Vital Signs On Initial Exam: 


 Initial Vitals











Temp Pulse Resp BP Pulse Ox


 


 98 F   67   22   112/65   100 


 


 09/18/18 20:57  09/18/18 20:57  09/18/18 20:57  09/18/18 20:57  09/18/18 20:57











Vital Signs Reviewed: Yes





Diagnostics





- Vital Signs


 Vital Signs











  Temp Pulse Resp BP Pulse Ox


 


 09/18/18 20:57  98 F  67  22  112/65  100














- Laboratory


Result Diagrams: 


 09/18/18 21:23





 09/18/18 21:23


Lab Statement: Any lab studies that have been ordered have been reviewed, and 

results considered in the medical decision making process.





- CT


  ** Brain CT


CT Interpretation: No Acute Changes -  1. No acute intracranial abnormality.    

2. Age-related atrophy and mild chronic small vessel ischemic disease. ED 

physician reviewed radiology report.


CT Interpretation Completed By: Radiologist





- EKG


  ** 21:16


Cardiac Rate: NL - 64 bpm


EKG Rhythm: Atrial Fibrillation


ST Segment: Non-Specific


EKG Interpretation: RBBB, LAD





Altered Mental Statu Course/Dx





- Course


Course Of Treatment: Patient presents alert and oriented and tells us that he 

isn't drinking Scotch prior to driving over to see his friend.  He is found to 

the altered at his friend's house.  EMS was called and found him to have normal 

blood sugar.  By the time he arrived here he was alert and oriented and 

normally conversant.  Blood work, head CT etc. all have been negative.  His 

alcohol however was 181 here.  He likely was over 200 when he was exhibiting 

symptoms.  He also was driving prior to this.  Patient was instructed never to 

drink and drive and to drink his Scotch in moderation.  He is discharged in 

good condition.





- Diagnoses


Differential Diagnosis/HQI/PQRI: Intoxication, Intracranial Bleed, Medication 

Reaction, Sepsis, Seizure


Provider Diagnoses: 


 Alcohol intoxication, Delirium








Discharge





- Sign-Out/Discharge


Documenting (check all that apply): Patient Departure - Discharge





- Discharge Plan


Condition: Improved


Disposition: HOME


Patient Education Materials:  Alcohol Intoxication (ED)


Referrals: 


Bonnie Guerrero MD [Primary Care Provider] - 


Additional Instructions: 


Never drive after drinking.  Only drink alcohol in moderation.  Return if worse

, new symptoms or other concerns as discussed.





- Billing Disposition and Condition


Condition: IMPROVED


Disposition: Home





- Attestation Statements


Document Initiated by Scribe: Yes


Documenting Scribe: Suzie Tian


Provider For Whom Scribe is Documenting (Include Credential): Baldemar Wu MD


Scribe Attestation: 


Suzie ROMO, scribed for Baldemar Wu MD on 09/19/18 at 0046. 


Scribe Documentation Reviewed: Yes


Provider Attestation: 


The documentation as recorded by the Suzie berry accurately reflects the 

service I personally performed and the decisions made by me, Baldemar Wu MD

## 2018-09-18 NOTE — XMS REPORT
Matti Anguiano

 Created on:September 10, 2018



Patient:Matti Anguiano

Sex:Male

:1929

External Reference #:[id number]





Demographics







 Address  [address]



   [address] [place] [address]

 

 Home Phone  [phone]

 

 Mobile Phone  [phone]

 

 Work Phone  [phone]

 

 Preferred Language  English

 

 Marital Status  Not  Or 

 

 Adventist Affiliation  Unknown

 

 Race  White

 

 Ethnic Group  Not  Or 









Author







 Organization  [facility] 

 

 Address  [address] [address]

 

 Phone  [phone]









Care Team Providers







 Name  Role  Phone

 

 Kimberley Orellana MD  Care Team Information   Unavailable









Payers







 Type  Date  Identification Numbers  Payment Provider  Subscriber

 

 Medicare Primary    Policy Number: [id number]  Medicare  Matti Anguiano









 PayID: [id number]  [facility] [address]









 [place] [address]







Problems







 Date  Description  Provider  Status

 

 Onset: 2005  Benign essential hypertension  Kimberley Orellana MD  Active

 

 Onset: 03/10/2011  Atrial fibrillation  Aron Herron MD  Active

 

 Onset: 03/10/2011  Benign prostatic hypertrophy without  Aron Herron MD
  Active



   outflow obstruction    

 

 Onset: 03/10/2011  Right bundle branch block  Aron Herron MD  Active

 

 Onset: 03/10/2011  Degenerative joint disease of hand  Aron Herron MD  
Active

 

 Onset: 10/20/2014  Raised prostate specific antigen  Bonnie Guerrero MD  Active

 

 Onset: 10/21/2014  Diverticular disease  Bonnie Guerrero MD  Active

 

 Onset: 10/21/2014  Mild memory disturbance  Bonnie Guerrero MD  Active

 

 Onset: 2015  Vitamin B12 deficiency (non anemic)  Bonnie Guerrero MD  
Active

 

 Onset: 10/21/2015  Asthma  Bonnie Guerrero MD  Active

 

 Onset: 10/21/2015  Obstructive sleep apnea syndrome  Bonnie Guerrero MD  Active

 

 Onset: 10/25/2015  Vitamin D deficiency  Bonnie Guerrero MD  Active

 

 Onset: 2016  Obstruction of lacrimal canaliculus  Bonnie Guerrero MD  
Active

 

 Onset: 2016  Essential hypertension  Bonnie Guerrero MD  Active

 

 Onset: 2016  Chronic atrial fibrillation  Bonnie Guerrero MD  Active

 

 Onset: 10/31/2016  Actinic keratosis  BiterIndio PA  Active







Family History







 Date  Family Member(s)  Problem(s)  Comments

 

 : ()  Father   due to Unknown  After travelling to [place].



 (age 45 Years)    Causes  Prolonged hospitalization



       (?pneumonia)

 

 :  (age  Mother   due to  



 75 Years)    Alzheimer's Disease  

 

   Siblings  Siblings: 1 sister. 1  



     sibling was "stillborn"  







Social History







 Type  Date  Description  Comments

 

 Marital Status    Single  

 

 Lives With    patient lives alone  

 

 Diet    Healthy, Well Balanced  

 

 Sleep    Typically sleeps 7 hours a  



     night  

 

 Occupation    Teacher  retired English literature



       professor

 

 Hand Dominance    RIGHT-handed  

 

 Cigarette Use    Never Smoked Cigarettes  

 

 ETOH Use    Occasionally consumes alcohol  scotch socially

 

 Smoking    Patient has never smoked  

 

 Exercise Type/Frequency    Does not exercise currently  







Allergies, Adverse Reactions, Alerts







 Date  Description  Reaction  Status  Severity  Comments

 

 2005  NKDA    active    







Medications







 Medication  Date  Status  Form  Strength  Qnty  SIG  Indications  Ordering



                 Provider

 

 Trelegy  /  Active  Aerosol  100-62.5-  60uni  1 puff daily  J44.9  
Macamaxx,



 Ellipta        25mcg/Inh  ts      Kimberley FARRIS MD

 

 Vitamin B-12  /  Active  Tablets  1000mcg  90tab  1 by mouth    Reyna,



           s  every day    Kimbreley FARRIS MD

 

 Memantine HCL  10/26/  Active  Tablets  5mg  60tab  2 by mouth  R41.3  Reyna,



           s  every day    Kimberley FARRIS MD

 

 Pradaxa  10/23/  Active  Capsules  150mg  180ca  take 1  I48.2  Reyna2017        ps  capsule by    Kimberley



             mouth twice    MD MARIN



             a day    

 

 Omeprazole  /  Active  Capsules DR  20mg  90cap  1 by mouth  K21.9  
Reyna,



           s  every day    Kimberley FARRIS MD

 

 Fluticasone  /  Active  Suspension  50mcg/Act  16uni  two sprays  J45.20  
Reyna



 Propionate          ts  in each    Kimberley



             nostril    MD MARIN



             every day    

 

 Diltiazem CD  10/25/  Active  Caps ER 24HR  240mg  90cap  1 qday  I48.2  Cesar,



           s      Bonnie Cuevas MD

 

 Ra Vitamin  10/22/  Active  Capsules  2000Unit  90cap  1 everyday  E55.9  
Reyna



 D-3          s  as    Kimberleyher baltazar FARRIS MD



             after the    



             high dose    



             treatment    

 

 Donepezil HCL  10/21/  Active  Tablets  10mg  90tab  1 tab by  R41.3  Reyna,



           s  mouth    Kimberley



             everyday    MD MARIN

 

 Proair HFA  /  Active  Aerosol  108(90Bas  1Can  2 p four  J44.9  Reyna,



         e)    times a day    Kimberley



         mcg/Act    as needed    MD MARIN









 J45.909

 

 J45.20









 Furosemide  2015  Active  Tablets  20mg  180tabs  1/2 tab  I10  Reyna,



             alternating with    Kimberley FARRIS MD



             1 tab everyday    









 I48.2









 Glucosamine-Chondroitin  2009  Active  Capsules  500-400    1 po  M18.9  
Trabout,



             tid    MD Aron

 

 Tylenol Arthritis Pain  2009  Active  Tablets ER  650mg    2 po  M18.9  
Trabout,



             tid prn    MD Aron

 

                 

 

 Azithromycin  2017  Hx  Tablets  250mg  6  2 tabs  R05  Reyna,



   -        t  day one    Kimberley



   2018        a  and 1    MD MARIN



           b  tab    



           s  daily    



             till    



             gone    

 

 Zostavax  10/26/2017  Hx  Suspension  91193Fcm  1  1  Z00.01  Reyna,



   -    Rec  /0.65ML  u  inject    Kimberley



   2018        n  imelda FARRIS MD



           i      



           t      



           s      

 

 Omeprazole  2017  Hx  Capsules DR  20mg  9  1 by    Reyna,



   -        0  mouth    Kimberley



   2017        c  every    MD MARIN



           a  day    



           p      



           s      

 

 Ferrous Sulfate  2017  Hx  Elixir  220(44Fe  4  take    [place],



   -      ) mg/5ML  7  one    Dulce C,



   2018        3  teaspoo    RN MS FNP



           u  nful by    



           n  mouth a    



           i  day    



           t      



           s      

 

 Prilosec OTC  2017  Hx  Tablets DR  20mg  6  1 by    Reyna,



   -        0  mouth    Kimbelrey



   2017        t  twice    MD MARIN



           a  daily    



           b      



           s      

 

 Nebulizer  2017  Hx  Kit    1  includi  J45.20  Reyna,



 Kit/Tubing/Mouthpiece  -        u  ng    Kimberley



   2017        n  paola FARRIS MD



           i  er. for    



           t  use    



           s  with    



             nebuliz    



             ed    



             albuter    



             ol. for    



             chronic    



             mild    



             asthma    



             with    



             exacerb    



             ation.    



             (j45.21    



             )    

 

 Cephalexin  2016  Hx  Capsules  500mg  2  1 tab  L03.11  Reyna,



   -        0  by  5  Kimberley



   2016        c  bonnie FARRIS MD



           a  twice a    



           p  day    



           s      

 

 Albuterol Sulfate  10/25/2015  Hx  Nebulizer  (2.5mg/3  7  inhale  J45.90  
[place],



   -      ML)  5  via neb  9  Kimberley



   10/26/2017      0.083%  marin FARRIS MD



           l  times a    



             day as    



             needed    



             sob/whe    



             ezing    









 J44.9









 Advair HFA  10/25/2015 -  Hx  Aerosol  115-21mcg/Act  16gm  1 puff bid  
J45.909  Bonnie Guerrero



   2017              MD Faith









 J44.9









 Vitamin D  10/22/2015 -  Hx  Capsules  57911Gdyv  8caps  take 1    Cesar



 (Ergocalciferol)  2016          capsule by    Bonnie



             mouth every    MD Faith



             week x 8    



             weeks then    



             change to low    



             dose 2000    



             units    



             everyday as    



             maintenance    

 

 Nexium  2015 -  Hx  Capsules DR  40mg  90caps  1 by mouth    Cesar



   10/25/2015          every day -    Bonnie



             free samples    MD Faith

 

 Vitamin B-12 ER  2015 -  Hx  Tablets ER  1000mcg    1 qday  D5  Cesar,



   2017            1.  Bonnie



               9  MD Faith

 

 Metoprolol  2014 -  Hx  Tablets ER  50mg    1 bid  I4  Cesar,



 Succinate ER  10/25/2015    24HR      increased  8.  Bonnie



             dose  0  MD Faith









 I10









 Losartan Potassium  2014 -  Hx  Tablets  100mg  90tabs  1/2 to 1  by    
Cesar



   2014          mouth every day    Bonnie



             for target bp <    MD Faith



             140/90    

 

 Phosphatidylserine  10/20/2014 -  Hx  Capsules  100mg    3 caps  R41  Cesar



   10/26/2017          occasionally  .1  Bonnie Cuevas MD









 R41.3









 Amoxicillin  2014 -  Hx  Tablets  875mg  20tabs  1 po bid  786.2  [place],



   2014              Dulce MARROQUIN RN MS MANUELAP

 

 Furosemide  09/10/2012 -  Hx  Tablets  40mg  30tabs  take 1    Bonnie Guerrero



   2015          tablet by    MD Faith



             mouth every    



             other day    



             as needed    



             for edema    

 

 Diovan  09/10/2012 -  Hx  Tablets  160mg  180tabs  take 1  401.1  [place],



   2015          tablet by    bonnie Reddy twice    RN MS MANUELAP



             a day pt    



             will leave    



             for    



             vacation,    



             pls    



             dispense 3    



             mo supply    



             now    

 

 Metoprolol  2011 -  Hx  Tablets ER  25mg  90tabs  1 twice a  427.31  Cesar
, Bonnie



 Succinate ER  2014    24HR      day by    MD Faith



             mouth every    



             day    



             (increased    



             dose)    

 

 Pradaxa  2011 -  Hx  Capsules  150mg  180caps  take 1  I48.0  [place],



   2017          capsule by    Kimberley



             mouth twice    MD MARIN



             a day    









 I48.91

 

 I48.2









 Coumadin  12/15/2010 -  Hx  Tablets  2.5mg  60tabs  1 po qd  427.31  Trabout,



   2011              MD Aron

 

 Cephalexin  10/25/2010 -  Hx  Tablets  500mg  30tabs  1 tid for 10  729.5  
[place],



   2011          days    Dulce MARROQUIN RN MS FNP

 

 Amoxicillin  2010 -  Hx  Tablets  875mg  20tabs  1 po bid  461.0  [place]
,



   2011              Kimberley FARRIS MD

 

 Keflex  2008 -  Hx  Capsules  500mg  20caps  1 po qid x 5  680.3  Trabout
,



   2008          days    MD Aron

 

 Amrix  2008 -  Hx  Caps ER  15mg  Samples  1-2 po qd  724.2  Trabout,



   2008    24HR          MD Aron

 

 Aricept  2007 -  Hx  Tablets  10mg  30tabs  1 po qd  780.93  Trabout,



   10/20/2014              MD Aron

 

 Zithromax  2007 -  Hx  Tablets  250mg  1tabs  take as  462  Trabout,



 Z-Jagdish  2007          directed    MD Aron

 

 Aspirin  2007 -  Hx  Gelcaps  325mg    1/4 po qd    [place],



   10/25/2015              Kimberley FARRIS MD

 

 Celebrex  2007 -  Hx  Capsules  200mg  Sample  1 PO qd prn  724.2  
[place],



   2007              Kimberley FARRIS MD

 

 Hydrocodone/Ap  2007 -  Hx    5/500  40units  1-2 po qid prn  724.2  
[place],



 ap  2007              Kimberley FARRIS MD

 

 Avelox  2006 -  Hx  Tablets  400mg  14tabs  1 po qd  562.11  Nguyễn,



   2006              Dulce MARROQUIN RN MS FNP

 

 Miralax  2006 -  Hx  Powder    255gm  1 capful with  564.00  Nguyễn,



   2006          8 0z of    Dulce MARROQUIN



             beverage once    RN MS JOLANTA



             daily as    



             needed to    



             prevent    



             constipation    

 

 Avelox  2006 -  Hx  Tablets  400mg  4tabs  1 po qd  789.9  Nguyễn,



   2006              Dulce MARROQUIN RN MS FNP

 

 Diovan  2005 -  Hx  Tablets  160mg  30tabs  1 po qd  401.1  Nguyễn,



   09/10/2012              Dulce MARROQUIN RN MS FNP

 

 Tetracycline  2005 -  Hx  Capsules    60caps  1 po bid    Trabout,



 HCL  2006              MD Aron

 

 Aspirin  2005 -  Hx  Tablets  81mg    1/4 po qd    Trabout,



   2007              MD Aron

 

 Temovate  2005 -  Hx  Cream  0.05%  30gm  apply to    Trabout,



   2006          affected areas    israel Sharp MD







Medications Administered in Office







 Medication  Date  Status  Form  Strength  Qnty  SIG  Indications  Ordering



                 Provider

 

 Albuterol Up    Administered  Injection          Biter,



 To 2.5mg &  018              GIGI oBrjas



 Ipatropium                



 Bromide Up To                



 0.5mg                



 Non-Compd                

 

 Torodol    Administered  Injection          [place],



 Injection 15  007              Kimberley



 MG Dose                MD MARIN

 

 Torjacobool    Administered  Injection          [place],



 Injection 15  007              Kimberley



 MG Dose                MD MARIN

 

 Torodol    Administered  Injection          [place],



 Injection 15  007              Kimberley



 MG Dose                MD MARIN

 

 Torjacobool    Administered  Injection          [place],



 Injection 15  007              Kimberley



 MG Dose                MD MARIN







Immunizations







 CPT Code  Status  Date  Vaccine  Lot #

 

 [id number]  Given  10/28/2017  Zoster (Zostavax)  

 

 23407  Given  10/26/2017  Influenza Vac, 3 Yrs & Older, Quadrivalent,  [id number]



       Split, Im Use  

 

 71976  Given  10/24/2016  Pneumococcal 23 Immunization Adult Or  [id number]



       Immunosuppressed Patient  

 

 88423  Given  10/24/2016  Influenza Vac, 3 Yrs & Older, Quadrivalent,  [id number]



       Split, Im Use  

 

 22821  Given  10/21/2015  Influenza Vac, 3 Yrs & Older, Quadrivalent,  [id number]



       Split, Im Use  

 

 39577  Given  10/21/2015  Prevnar 13 Pneumococal Conjugate Vaccine  [id number]

 

   Given  10/20/2014  Fluzone Trivalent Immunization  [id number]

 

 82306  Given  10/20/2014  Prevnar 13 Pneumococal Conjugate Vaccine  [id number]

 

   Given  10/28/2013  Fluzone Trivalent Immunization  [id number]

 

   Given  10/22/2012  Fluzone Trivalent Immunization  [id number]

 

   Given  2011  Fluzone Trivalent Immunization  [id number]

 

 69555  Given  10/27/2010  Afluria Or Fluvirin Flu Vac Intramuscular  [id number]

 

 86125  Given  02/15/2010  Immunization Td 7 Yrs Or Older  [id number]

 

 62017  Given  12/15/2009  Afluria Or Fluvirin Flu Vac Intramuscular  [id number]

 

 09561  Given  10/29/2007  Afluria Or Fluvirin Flu Vac Intramuscular  

 

 93149  Given  10/29/2007  Afluria Or Fluvirin Flu Vac Intramuscular  [id number]

 

 40193  Given  10/03/2005  Afluria Or Fluvirin Flu Vac Intramuscular  [id number]

 

 71402  Given  10/13/2004  Pneumococcal 23 Immunization Adult Or  



       Immunosuppressed Patient  

 

 57317  Given  10/13/2004  Afluria Or Fluvirin Flu Vac Intramuscular  

 

 65658  Given  10/02/2003  Afluria Or Fluvirin Flu Vac Intramuscular  

 

 29965  Given  2002  Afluria Or Fluvirin Flu Vac Intramuscular  







Vital Signs







 Date  Vital  Result  Comment

 

 09/10/2018  Weight  214.50 lb  









 Heart Rate  76 /min  

 

 BP Systolic  117 mmHg  

 

 BP Diastolic  65 mmHg  

 

 Height  73.5 inches  6'1.50"

 

 BMI (Body Mass Index)  27.9 kg/m2  









 2018  Weight  207.25 lb  









 Heart Rate  66 /min  

 

 BP Systolic  104 mmHg  

 

 BP Diastolic  65 mmHg  

 

 Height  73.5 inches  6'1.50"

 

 BMI (Body Mass Index)  27.0 kg/m2  









 2018  Weight  209.25 lb  









 Heart Rate  72 /min  

 

 BP Systolic  111 mmHg  

 

 BP Diastolic  72 mmHg  

 

 Height  73.5 inches  6'1.50"

 

 BMI (Body Mass Index)  27.2 kg/m2  









 2018  Weight  211.00 lb  









 Heart Rate  101 /min  

 

 BP Systolic  113 mmHg  

 

 BP Diastolic  63 mmHg  

 

 Height  73.5 inches  6'1.50"

 

 BMI (Body Mass Index)  27.5 kg/m2  









 2017  Body Temperature  97.5 F  









 Weight  215.00 lb  

 

 Heart Rate  80 /min  

 

 BP Systolic  116 mmHg  

 

 BP Diastolic  64 mmHg  

 

 Height  73.5 inches  6'1.50"

 

 BMI (Body Mass Index)  28.0 kg/m2  









 2017  Weight  214.12 lb  









 Heart Rate  60 /min  

 

 BP Systolic  113 mmHg  

 

 BP Diastolic  70 mmHg  

 

 Height  73.5 inches  6'1.50"

 

 BMI (Body Mass Index)  27.9 kg/m2  









 2017  Weight  216.25 lb  









 Heart Rate  80 /min  

 

 BP Systolic  132 mmHg  

 

 BP Diastolic  84 mmHg  

 

 Height  73.5 inches  6'1.50"

 

 BMI (Body Mass Index)  28.1 kg/m2  









 2017  Body Temperature  97.9 F  









 Weight  216.00 lb  

 

 Heart Rate  82 /min  

 

 BP Systolic  127 mmHg  

 

 BP Diastolic  76 mmHg  

 

 Height  73.5 inches  6'1.50"

 

 BMI (Body Mass Index)  28.1 kg/m2  









 10/26/2017  Weight  216.12 lb  









 Heart Rate  61 /min  

 

 BP Systolic  127 mmHg  

 

 BP Diastolic  80 mmHg  

 

 Height  73.5 inches  6'1.50"

 

 BMI (Body Mass Index)  28.1 kg/m2  

 

 Urine Dipstick - Blood  NEGATIVE  

 

 Urine Dipstick - Protein  NEGATIVE  

 

 Urine Dipstick - Glucose  NEGATIVE  

 

 Urine Dipstick - Leukocytes  NEGATIVE  

 

 Right Visual Acuity Distance  20/30  

 

 Left Visual Acuity Distance  20/30  Both 20/30









 2017  Weight  220.25 lb  









 Heart Rate  110 /min  

 

 BP Systolic  99 mmHg  

 

 BP Diastolic  62 mmHg  

 

 Height  73.5 inches  6'1.50"

 

 BMI (Body Mass Index)  28.7 kg/m2  









 2017  Body Temperature  99.0 F  









 Weight  220.00 lb  

 

 Heart Rate  83 /min  

 

 BP Systolic  124 mmHg  

 

 BP Diastolic  69 mmHg  

 

 Height  73.5 inches  6'1.50"

 

 BMI (Body Mass Index)  28.6 kg/m2  









 10/24/2016  Weight  221.00 lb  









 Heart Rate  74 /min  

 

 BP Systolic  118 mmHg  

 

 BP Diastolic  73 mmHg  

 

 Height  73.5 inches  6'1.50"

 

 BMI (Body Mass Index)  28.8 kg/m2  

 

 Urine Dipstick - Blood  NEGATIVE  

 

 Urine Dipstick - Protein  NEGATIVE  

 

 Urine Dipstick - Glucose  NEGATIVE  

 

 Urine Dipstick - Leukocytes  NEGATIVE  

 

 Left ear audiology results  FAIL  Has Hearing AIDS

 

 Right ear audiology results  FAIL  But Not With Him

 

 Right Visual Acuity Distance  20/30  

 

 Left Visual Acuity Distance  20/30  Both 20/30 Corrected









 2016  Weight  223.50 lb  









 Heart Rate  90 /min  

 

 BP Systolic  120 mmHg  

 

 BP Diastolic  76 mmHg  

 

 Height  73.5 inches  6'1.50"

 

 BMI (Body Mass Index)  29.1 kg/m2  









 2016  Weight  223.12 lb  









 Heart Rate  78 /min  

 

 BP Systolic  122 mmHg  

 

 BP Diastolic  75 mmHg  









 2016  Weight  228.50 lb  









 Heart Rate  80 /min  

 

 BP Systolic  142 mmHg  

 

 BP Diastolic  75 mmHg  









 2016  Body Temperature  98.6 F  









 Weight  233.19 lb  

 

 Heart Rate  92 /min  

 

 BP Systolic  130 mmHg  

 

 BP Diastolic  80 mmHg  

 

 Height  73.5 inches  6'1.50"

 

 O2 % BldC Oximetry  95 %  

 

 O2 Saturation Level with Exercise  92 %  

 

 BMI (Body Mass Index)  30.3 kg/m2  

 

 Urine Dipstick - Blood  NEGATIVE  

 

 Urine Dipstick - Protein  NEGATIVE  

 

 Urine Dipstick - Glucose  NEGATIVE  

 

 Urine Dipstick - Leukocytes  NEGATIVE  

 

 Right Visual Acuity Distance  20/30  

 

 Left Visual Acuity Distance  20/30  Both 25/25









 10/21/2015  Weight  221.50 lb  









 Heart Rate  81 /min  

 

 BP Systolic  116 mmHg  

 

 BP Diastolic  75 mmHg  

 

 Height  73.5 inches  6'1.50"

 

 BMI (Body Mass Index)  28.8 kg/m2  

 

 Urine Dipstick - Blood  NEGATIVE  

 

 Urine Dipstick - Protein  TRACE  

 

 Urine Dipstick - Glucose  NEGATIVE  

 

 Urine Dipstick - Leukocytes  1+  

 

 Right Visual Acuity Distance  20/30  

 

 Left Visual Acuity Distance  20/30  Both 20/25









 2015  Weight  217.00 lb  









 Heart Rate  86 /min  

 

 BP Systolic  134 mmHg  

 

 BP Diastolic  91 mmHg  

 

 Height  73.5 inches  6'1.50"

 

 BMI (Body Mass Index)  28.2 kg/m2  









 2015  Weight  213.00 lb  









 Heart Rate  90 /min  

 

 BP Systolic  111 mmHg  

 

 BP Diastolic  73 mmHg  

 

 Height  73.5 inches  6'1.50"

 

 O2 % BldC Oximetry  95 %  

 

 O2 Saturation Level with Exercise  94 %  

 

 BMI (Body Mass Index)  27.7 kg/m2  









 2015  BP Systolic Recheck  84 mmHg  RIGHT arm









 BP Diastolic Recheck  60 mmHg  RIGHT arm









 2015  Weight  202.00 lb  









 Heart Rate  69 /min  

 

 BP Systolic  86 mmHg  

 

 BP Diastolic  56 mmHg  

 

 Height  74 inches  6'2"

 

 BMI (Body Mass Index)  25.9 kg/m2  









 10/29/2014  Weight  223.25 lb  









 Heart Rate  94 /min  

 

 BP Systolic  111 mmHg  

 

 BP Diastolic  65 mmHg  

 

 Height  74 inches  6'2"

 

 BMI (Body Mass Index)  28.7 kg/m2  









 10/20/2014  Weight  222.00 lb  









 Heart Rate  77 /min  

 

 BP Systolic  129 mmHg  

 

 BP Diastolic  72 mmHg  

 

 Height  74 inches  6'2"

 

 BMI (Body Mass Index)  28.5 kg/m2  

 

 Urine Dipstick - Blood  NEGATIVE  

 

 Urine Dipstick - Protein  NEGATIVE  

 

 Urine Dipstick - Glucose  NEGATIVE  









 2014  Weight  219.50 lb  









 Heart Rate  60 /min  

 

 BP Systolic  115 mmHg  

 

 BP Diastolic  57 mmHg  









 2014  Weight  224.00 lb  









 Heart Rate  99 /min  

 

 BP Systolic  136 mmHg  

 

 BP Diastolic  80 mmHg  









 2014  Body Temperature  100.0 F  









 Weight  223.25 lb  

 

 Heart Rate  96 /min  

 

 BP Systolic  136 mmHg  

 

 BP Diastolic  70 mmHg  

 

 O2 Saturation Level with Exercise  93 %  









 02/10/2014  Weight  222.00 lb  









 Heart Rate  109 /min  

 

 BP Systolic  149 mmHg  

 

 BP Diastolic  78 mmHg  









 10/28/2013  Weight  225.00 lb  









 Heart Rate  50 /min  

 

 BP Systolic  126 mmHg  

 

 BP Diastolic  72 mmHg  









 2013  Body Temperature  98.3 F  









 Weight  221.00 lb  

 

 Heart Rate  52 /min  

 

 BP Systolic  120 mmHg  

 

 BP Diastolic  75 mmHg  









 2013  Weight  220.00 lb  









 Heart Rate  91 /min  

 

 BP Systolic  115 mmHg  

 

 BP Diastolic  68 mmHg  









 10/22/2012  Weight  220.00 lb  









 Heart Rate  77 /min  

 

 BP Systolic  103 mmHg  

 

 BP Diastolic  65 mmHg  









 09/10/2012  Weight  226.00 lb  









 Heart Rate  73 /min  

 

 BP Systolic  146 mmHg  

 

 BP Diastolic  89 mmHg  

 

 O2 Saturation Level with Exercise  93 %  









 2012  Weight  229.00 lb  









 Heart Rate  76 /min  

 

 BP Systolic  135 mmHg  

 

 BP Diastolic  86 mmHg  









 2012  Weight  227.00 lb  









 Heart Rate  68 /min  

 

 BP Systolic  150 mmHg  

 

 BP Diastolic  87 mmHg  

 

 Urine Dipstick - Blood  2+  And Pos For A Trace Of WBC

 

 Urine Dipstick - Protein  NEGATIVE  

 

 Urine Dipstick - Glucose  NEGATIVE  









 2011  Weight  231.00 lb  









 Heart Rate  65 /min  

 

 BP Systolic  143 mmHg  

 

 BP Diastolic  78 mmHg  

 

 Height  74.25 inches  6'2.25"

 

 BMI (Body Mass Index)  29.5 kg/m2  









 2011  Weight  229.00 lb  









 Heart Rate  79 /min  

 

 BP Systolic  141 mmHg  

 

 BP Diastolic  87 mmHg  









 2011  Weight  226.00 lb  









 Heart Rate  58 /min  

 

 BP Systolic  139 mmHg  

 

 BP Diastolic  80 mmHg  









 2011  Weight  223.00 lb  









 Heart Rate  58 /min  

 

 BP Systolic  140 mmHg  

 

 BP Diastolic  72 mmHg  









 02/15/2011  Body Temperature  98.8 F  

 

 2011  Weight  223.00 lb  









 Heart Rate  91 /min  

 

 BP Systolic  147 mmHg  

 

 BP Diastolic  95 mmHg  









 12/15/2010  Weight  227.00 lb  









 Heart Rate  67 /min  

 

 BP Systolic  139 mmHg  

 

 BP Diastolic  82 mmHg  

 

 Height  73 inches  6'1"

 

 BMI (Body Mass Index)  29.9 kg/m2  

 

 Urine Dipstick - Blood  NEGATIVE  

 

 Urine Dipstick - Protein  NEGATIVE  

 

 Urine Dipstick - Glucose  NEGATIVE  









 10/27/2010  Weight  226.00 lb  









 Heart Rate  53 /min  

 

 BP Systolic  122 mmHg  

 

 BP Diastolic  76 mmHg  









 10/25/2010  Weight  226.00 lb  









 Heart Rate  64 /min  

 

 BP Systolic  127 mmHg  

 

 BP Diastolic  77 mmHg  









 2010  Body Temperature  98.0 F  









 Weight  223.00 lb  

 

 Heart Rate  80 /min  

 

 BP Systolic  124 mmHg  

 

 BP Diastolic  68 mmHg  









 2010  Weight  224.00 lb  









 Heart Rate  76 /min  

 

 BP Systolic  140 mmHg  

 

 BP Diastolic  84 mmHg  









 2009  Heart Rate  72 /min  









 BP Systolic  143 mmHg  

 

 BP Diastolic  88 mmHg  









 2009  Weight  207.00 lb  









 Heart Rate  43 /min  

 

 BP Systolic  144 mmHg  

 

 BP Diastolic  93 mmHg  

 

 Height  73.5 inches  6'1.50"

 

 BMI (Body Mass Index)  26.9 kg/m2  

 

 Urine Dipstick - Blood  NEGATIVE  

 

 Urine Dipstick - Protein  NEGATIVE  

 

 Urine Dipstick - Glucose  NEGATIVE  









 2009  Weight  218.00 lb  









 Heart Rate  40 /min  

 

 BP Systolic  134 mmHg  

 

 BP Diastolic  83 mmHg  









 10/24/2008  Body Temperature  97.6 F  









 Weight  222.00 lb  

 

 Heart Rate  69 /min  

 

 BP Systolic  135 mmHg  

 

 BP Diastolic  87 mmHg  

 

 Height  74 inches  6'2"

 

 BMI (Body Mass Index)  28.5 kg/m2  









 2008  Weight  227.00 lb  









 Heart Rate  72 /min  

 

 BP Systolic  133 mmHg  

 

 BP Diastolic  88 mmHg  

 

 Height  74 inches  6'2"

 

 BMI (Body Mass Index)  29.1 kg/m2  









 2008  Weight  226.00 lb  









 Heart Rate  54 /min  

 

 BP Systolic  123 mmHg  

 

 BP Diastolic  78 mmHg  

 

 Height  74 inches  6'2"

 

 BMI (Body Mass Index)  29.0 kg/m2  









 2008  Weight  222.00 lb  









 Heart Rate  81 /min  

 

 BP Systolic  146 mmHg  

 

 BP Diastolic  76 mmHg  

 

 Height  74 inches  6'2"

 

 BMI (Body Mass Index)  28.5 kg/m2  









 2008  Weight  224.00 lb  









 Heart Rate  60 /min  

 

 BP Systolic  142 mmHg  

 

 BP Diastolic  85 mmHg  

 

 Height  74 inches  6'2"

 

 BMI (Body Mass Index)  28.8 kg/m2  









 2007  Weight  227.00 lb  









 Heart Rate  74 /min  

 

 BP Systolic  138 mmHg  

 

 BP Diastolic  71 mmHg  

 

 Height  74 inches  6'2"

 

 BMI (Body Mass Index)  29.1 kg/m2  









 10/29/2007  Body Temperature  97.8 F  









 Height  74 inches  6'2"









 2007  Body Temperature  97.1 F  









 Weight  226.00 lb  

 

 Heart Rate  74 /min  

 

 BP Systolic  147 mmHg  

 

 BP Diastolic  80 mmHg  

 

 Height  74 inches  6'2"

 

 BMI (Body Mass Index)  29.0 kg/m2  









 2007  Weight  224.00 lb  









 Heart Rate  74 /min  

 

 BP Systolic  134 mmHg  

 

 BP Diastolic  68 mmHg  

 

 Height  74 inches  6'2"

 

 BMI (Body Mass Index)  28.8 kg/m2  

 

 Urine Dipstick - Blood  NEGATIVE  

 

 Urine Dipstick - Protein  NEGATIVE  

 

 Urine Dipstick - Glucose  NEGATIVE  









 2007  Weight  227.00 lb  









 Heart Rate  52 /min  

 

 BP Systolic  126 mmHg  

 

 BP Diastolic  68 mmHg  









 2007  Weight  227.00 lb  









 Heart Rate  64 /min  

 

 BP Systolic  148 mmHg  

 

 BP Diastolic  72 mmHg  









 2006  Weight  226.00 lb  









 Heart Rate  30 /min  

 

 BP Systolic  144 mmHg  

 

 BP Diastolic  72 mmHg  









 2006  Weight  223.00 lb  









 Heart Rate  80 /min  

 

 BP Systolic  164 mmHg  

 

 BP Diastolic  82 mmHg  









 2006  Weight  222.00 lb  









 Heart Rate  78 /min  

 

 BP Systolic  124 mmHg  

 

 BP Diastolic  60 mmHg  









 06/15/2006  Weight  223.00 lb  









 Heart Rate  70 /min  

 

 BP Systolic  118 mmHg  

 

 BP Diastolic  64 mmHg  









 2006  Body Temperature  97.2 F  









 Weight  225.00 lb  

 

 Heart Rate  70 /min  

 

 BP Systolic  134 mmHg  

 

 BP Diastolic  74 mmHg  









 05/15/2006  Weight  229.00 lb  









 Heart Rate  60 /min  

 

 BP Systolic  140 mmHg  

 

 BP Diastolic  74 mmHg  









 2006  Weight  217.00 lb  









 Heart Rate  80 /min  

 

 BP Systolic  154 mmHg  

 

 BP Diastolic  82 mmHg  









 2005  Weight  231.00 lb  









 Heart Rate  62 /min  

 

 BP Systolic  138 mmHg  

 

 BP Diastolic  76 mmHg  









 2005  Weight  222.00 lb  









 Heart Rate  67 /min  

 

 BP Systolic  122 mmHg  

 

 BP Diastolic  63 mmHg  









 2005  Weight  227.00 lb  









 Heart Rate  66 /min  

 

 BP Systolic  150 mmHg  

 

 BP Diastolic  86 mmHg  

 

 Urine Dipstick - Blood  NEGATIVE  

 

 Urine Dipstick - Protein  NEGATIVE  

 

 Urine Dipstick - Glucose  NEGATIVE  









 2005  Weight  231.00 lb  









 Heart Rate  58 /min  

 

 BP Systolic  143 mmHg  

 

 BP Diastolic  83 mmHg  







Results







 Test  Date  Test  Result  H/L  Range  Note

 

 CBC with Auto Diff-fcmg  2018  WBC  8.8 K/uL    4.1-11.0  









 RBC  4.07 M/uL  Low  4.60-6.10  

 

 Hemoglobin  14.3 gm/dL    13.5-18.0  

 

 Hematocrit  42.5 %    41.0-53.0  

 

 MCV  104.6 fL  High  80.0-97.0  

 

 MCH  35.1 pg  High  27.0-32.0  

 

 MCHC  33.5 g/dL    32.0-36.0  

 

 RDW  15.0 %  High  11.5-14.5  

 

 PLT Count  383 K/ul    140-400  

 

 MPV  7.4 FL    7.1-10.7  

 

 Neutrophil  71.4 %    35.0-75.0  

 

 Lymphocyte  15.6 %  Low  16.0-52.0  

 

 Monocyte  7.6 %    2.0-10.0  

 

 Eosinophil  4.1 %    0.0-5.0  

 

 Basophil  1.3 %    0.0-4.0  

 

 Abs Neutrophils  6.3 K/uL    2.1-8.0  

 

 Abs Lymphocytes  1.4 K/uL    0.8-5.5  

 

 Abs Monocytes  0.7 K/uL    0.1-1.0  

 

 Abs Eosinophils  0.4 K/uL    0.0-0.5  

 

 Abs Basophils  0.1 K/uL    0.0-0.3  









 Iron Panel  2018  Iron, Total  127 g/dL      









 Transferrin  192.0 mg/dL  Low  203.0-362.0  

 

 Tibc (calc)  269 g/dL    261-478  

 

 % Iron Saturation  47.2 %  High  13.0-45.0  









 Laboratory test finding  2018  Ferritin  209.2 ng/ml    23.9-336.2  









 Folate  >23.0 ng/ml    5.9-24.8  

 

 Vitamin B12  279 pg/mL    180-914  









 Laboratory test finding  2018  Lead,Venous WB-RL  <pending>      

 

 Lead Venous  2018  Lead,Venous WB @  <2.0 g/dL    (0.0-4.9)  1

 

 CBC With Auto Diff  2017  WBC  6.9 K/uL    4.1-11.0  









 RBC  4.44 M/uL  Low  4.60-6.10  

 

 Hemoglobin  15.9 gm/dL    13.5-18.0  

 

 Hematocrit  46.4 %    41.0-53.0  

 

 MCV  104.6 fL  High  80.0-97.0  

 

 MCH  35.9 pg  High  27.0-32.0  

 

 MCHC  34.3 g/dL    32.0-36.0  

 

 RDW  14.3 %    11.5-14.5  

 

 PLT Count  266 K/ul    140-400  

 

 MPV  7.6 FL    7.1-10.7  

 

 Neutrophil  71.0 %    35.0-75.0  

 

 Lymphocyte  17.7 %    16.0-52.0  

 

 Monocyte  9.1 %    2.0-10.0  

 

 Eosinophil  1.8 %    0.0-5.0  

 

 Basophil  0.4 %    0.0-4.0  

 

 Abs Neutrophils  4.9 K/uL    2.1-8.0  

 

 Abs Lymphocytes  1.2 K/uL    0.8-5.5  

 

 Abs Monocytes  0.6 K/uL    0.1-1.0  

 

 Abs Eosinophils  0.1 K/uL    0.0-0.5  

 

 Abs Basophils  0.0 K/uL    0.0-0.3  









 Laboratory test finding  2017  Iron, Total  88 g/dL      

 

 CBC With Auto Diff  10/26/2017  WBC  7.3 K/uL    4.1-11.0  









 RBC  4.35 M/uL  Low  4.60-6.10  

 

 Hemoglobin  15.8 gm/dL    13.5-18.0  

 

 Hematocrit  46.4 %    41.0-53.0  

 

 MCV  106.8 fL  High  80.0-97.0  

 

 MCH  36.2 pg  High  27.0-32.0  

 

 MCHC  33.9 g/dL    32.0-36.0  

 

 RDW  14.6 %  High  11.5-14.5  

 

 PLT Count  265 K/ul    140-400  

 

 MPV  8.0 FL    7.1-10.7  

 

 Neutrophil  65.7 %    35.0-75.0  

 

 Lymphocyte  21.7 %    16.0-52.0  

 

 Monocyte  9.3 %    2.0-10.0  

 

 Eosinophil  2.4 %    0.0-5.0  

 

 Basophil  0.9 %    0.0-4.0  

 

 Abs Neutrophils  4.8 K/uL    2.1-8.0  

 

 Abs Lymphocytes  1.6 K/uL    0.8-5.5  

 

 Abs Monocytes  0.7 K/uL    0.1-1.0  

 

 Abs Eosinophils  0.2 K/uL    0.0-0.5  

 

 Abs Basophils  0.1 K/uL    0.0-0.3  









 Comprehensive Met Panel-[facility]  10/26/2017  Sodium  142 mmol/L    135-146  2









 Potassium  4.1 mmol/L    3.5-5.2  

 

 Chloride#  103 mmol/L      3

 

 Carbon Dioxide  34 mmol/L    24-34  

 

 Glucose  77 mg/dL      

 

 Creatinine  1.1 mg/dL    0.5-1.4  

 

 Calcium  9.9 mg/dL    8.5-10.2  

 

 Total Protein  6.7 g/dL    6.0-8.0  

 

 Albumin  4.1 g/dL    3.6-4.9  

 

 Globulin  2.6 g/dL    2.0-3.5  

 

 A/G Ratio  1.6 Ratio    1.0-2.2  

 

 Total Bilirubin  0.8 mg/dL    0.1-1.3  

 

 Alkaline Phosphatase  66 U/L      

 

 Alt  16 U/L    3-42  

 

 Ast  16 U/L    8-42  

 

  Kristin Egfr  >60    >60  4

 

 Non  Kristin Egfr  >60    >60  5

 

 Anion Gap  5 mmol/L  Low  7-16  6

 

 BUN  12 mg/dL    6-26  









 Lipid  10/26/2017  Cholesterol  171 mg/dL      









 Triglycerides  92 mg/dL      

 

 HDL  57 mg/dL    29-71  7

 

 Chol/ HDL Ratio  3.0 ratio  Low  4.0-6.7  

 

 VLDL  18 mg/dL    2-29  

 

 LDL (Calc)  96 mg/dL    20-99  8









 Laboratory test finding  10/26/2017  PSA  7.650 ng/mL  High  0.000-4.000  9









 Vit D25oh  28 ng/mL  Low    









 CBC With Auto Diff  2017  WBC  7.0 K/uL    4.1-11.0  









 RBC  4.41 M/uL  Low  4.60-6.10  

 

 Hemoglobin  12.9 gm/dL  Low  13.5-18.0  

 

 Hematocrit  41.0 %    41.0-53.0  

 

 MCV  93.0 fL    80.0-97.0  

 

 MCH  29.3 pg    27.0-32.0  

 

 MCHC  31.5 g/dL  Low  32.0-36.0  

 

 RDW  27.9 %  High  11.5-14.5  

 

 PLT Count  243 K/ul    140-400  

 

 Neutrophil  57.6 %    35.0-75.0  

 

 Lymphocyte  25.6 %    16.0-52.0  

 

 Monocyte  11.0 %  High  2.0-10.0  

 

 Eosinophil  4.2 %    0.0-5.0  

 

 Basophil  1.6 %    0.0-4.0  

 

 Abs Neutrophils  4.0 K/uL    2.1-8.0  

 

 Abs Lymphocytes  1.8 K/uL    0.8-5.5  

 

 Abs Monocytes  0.8 K/uL    0.1-1.0  

 

 Abs Eosinophils  0.3 K/uL    0.0-0.5  

 

 Abs Basophils  0.1 K/uL    0.0-0.3  









 Iron Panel  2017  Iron, Total  39 g/dL  Low    









 Transferrin  305.0 mg/dL    203.0-362.0  

 

 Tibc (calc)  427 g/dL    261-478  

 

 % Iron Saturation  9.1 %  Low  13.0-45.0  









 Laboratory test finding  2017  Vitamin B12  214 pg/mL    180-914  

 

 CBC With Auto Diff  2017  WBC  8.2 K/uL    4.1-11.0  









 RBC  2.91 Results mirella <SEE NOTE> M/uL  Low  4.60-6.10  10

 

 Hemoglobin  7.5 Results veri <SEE NOTE> gm/dL  Low  13.5-18.0  11

 

 Hematocrit  24.2 Results mirella <SEE NOTE> %  Low  41.0-53.0  12

 

 MCV  83.2 fL    80.0-97.0  

 

 MCH  25.9 pg  Low  27.0-32.0  

 

 MCHC  31.1 g/dL  Low  32.0-36.0  

 

 RDW  20.6 %  High  11.5-14.5  

 

 PLT Count  300 K/ul    140-400  

 

 Neutrophil  74.0 %    35.0-75.0  

 

 Lymphocyte  14.1 %  Low  16.0-52.0  

 

 Monocyte  9.8 %    2.0-10.0  

 

 Eosinophil  1.2 %    0.0-5.0  

 

 Basophil  0.9 %    0.0-4.0  

 

 Abs Neutrophils  6.0 K/uL    2.1-8.0  

 

 Abs Lymphocytes  1.2 K/uL    0.8-5.5  

 

 Abs Monocytes  0.8 K/uL    0.1-1.0  

 

 Abs Eosinophils  0.1 K/uL    0.0-0.5  

 

 Abs Basophils  0.1 K/uL    0.0-0.3  









 CBC With Auto Diff  10/14/2016  WBC  6.9 K/uL    4.1-11.0  









 RBC  4.52 M/uL  Low  4.60-6.10  

 

 Hemoglobin  16.1 gm/dL    13.5-18.0  

 

 Hematocrit  47.9 %    41.0-53.0  

 

 MCV  106.1 fL  High  80.0-97.0  

 

 MCH  35.7 pg  High  27.0-32.0  

 

 MCHC  33.6 g/dL    32.0-36.0  

 

 RDW  15.0 %  High  11.5-14.5  

 

 PLT Count  275 K/ul    140-400  

 

 Neutrophil  61.1 %    35.0-75.0  

 

 Lymphocyte  26.8 %    16.0-52.0  

 

 Monocyte  8.0 %    2.0-10.0  

 

 Eosinophil  2.9 %    0.0-5.0  

 

 Basophil  1.2 %    0.0-4.0  

 

 Abs Neutrophils  4.2 K/uL    2.1-8.0  

 

 Abs Lymphocytes  1.8 K/uL    0.8-5.5  

 

 Abs Monocytes  0.5 K/uL    0.1-1.0  

 

 Abs Eosinophils  0.2 K/uL    0.0-0.5  

 

 Abs Basophils  0.1 K/uL    0.0-0.3  









 Comprehensive Metabolic (CMP)  10/14/2016  Sodium  141 mmol/L    134-142  









 Potassium  5.1 mmol/L    3.5-5.2  

 

 Chloride  103 mmol/L      

 

 Carbon Dioxide  34 mmol/L    24-34  

 

 Glucose  114 mg/dL  High    

 

 BUN  7 mg/dL    6-26  

 

 Creatinine  1.0 mg/dL    0.5-1.4  

 

 Calcium  9.8 mg/dL    8.5-10.2  

 

 Total Protein  6.5 g/dL    6.0-8.0  

 

 Albumin  3.9 g/dL    3.6-4.9  

 

 Globulin  2.6 g/dL    2.0-3.5  

 

 A/G Ratio  1.5 Ratio    1.0-2.2  

 

 Total Bilirubin  0.8 mg/dL    0.1-1.3  

 

 Alkaline Phosphatase  60 U/L      

 

 Alt  17 U/L    3-42  

 

 Ast  15 U/L    8-42  

 

 Anion Gap  9 mmol/L    6-14  

 

 African Kristin Egfr  >60    >60  13

 

 Non  Kristin Egfr  >60    >60  14









 Lipid  10/14/2016  Cholesterol  147 mg/dL      









 Triglycerides  55 mg/dL      

 

 HDL  57 mg/dL    29-71  15

 

 Chol/ HDL Ratio  2.6 ratio  Low  4.0-6.7  

 

 VLDL  11 mg/dL    2-29  

 

 LDL (Calc)  79 mg/dL    20-99  16









 Laboratory test finding  10/14/2016  PSA  8.630 ng/mL  High  0.000-4.000  17









 Vit D,25 Hydroxy  28 ng/mL  Low    

 

 Vitamin B12  174 pg/mL  Low  180-914  









 Laboratory test finding  10/21/2015  Urine Culture  Microbiology res <SEE      
18



       NOTE>      

 

 Comprehensive Metabolic  10/21/2015  Sodium  139 mmol/L    134-142  



 (CMP)            









 Potassium  4.6 mmol/L    3.5-5.2  

 

 Chloride  103 mmol/L      

 

 Carbon Dioxide  31 mmol/L    24-34  

 

 Glucose  82 mg/dL      

 

 BUN  12 mg/dL    6-26  

 

 Creatinine  1.0 mg/dL    0.5-1.4  

 

 Calcium  9.7 mg/dL    8.5-10.2  

 

 Total Protein  6.3 g/dL    6.0-8.0  

 

 Albumin  3.8 g/dL    3.6-4.9  

 

 Globulin  2.5 g/dL    2.0-3.5  

 

 A/G Ratio  1.5 Ratio    1.0-2.2  

 

 Total Bilirubin  0.7 mg/dL    0.1-1.3  

 

 Alkaline Phosphatase  65 U/L      

 

 Alt  15 U/L    3-42  

 

 Ast  15 U/L    8-42  

 

 Anion Gap  10 mmol/L    6-14  

 

 African Kristin Egfr  >60    >60  19

 

 Non  Kristin Egfr  >60    >60  20









 CBC With Auto Diff  10/21/2015  WBC  6.9 K/uL    4.1-11.0  









 RBC  4.53 M/uL  Low  4.60-6.10  

 

 Hemoglobin  15.7 gm/dL    13.5-18.0  

 

 Hematocrit  47.1 %    41.0-53.0  

 

 MCV  104.0 fL  High  80.0-97.0  

 

 MCH  34.6 pg  High  27.0-32.0  

 

 MCHC  33.3 g/dL    32.0-36.0  

 

 RDW  14.6 %  High  11.5-14.5  

 

 PLT Count  238 K/ul    140-400  

 

 Neutrophil  63.3 %    35.0-75.0  

 

 Lymphocyte  22.2 %    16.0-52.0  

 

 Monocyte  10.7 %  High  2.0-10.0  

 

 Eosinophil  3.2 %    0.0-5.0  

 

 Basophil  0.6 %    0.0-4.0  

 

 Abs Neutrophils  4.4 K/uL    2.1-8.0  

 

 Abs Lymphocytes  1.5 K/uL    0.8-5.5  

 

 Abmon  0.7 K/uL    0.1-1.0  

 

 Abs Eosinophils  0.2 K/uL    0.0-0.5  

 

 Abs Basophils  0.0 K/uL    0.0-0.3  









 Lipid  10/21/2015  Cholesterol  146 mg/dL      









 Triglycerides  78 mg/dL      

 

 HDL  48 mg/dL    29-71  21

 

 Chol/ HDL Ratio  3.0 ratio  Low  4.0-6.7  

 

 VLDL  16 mg/dL    2-29  

 

 LDL (Calc)  82 mg/dL    20-99  22









 Laboratory test finding  10/21/2015  Vitamin B12  249 pg/mL    180-914  









 Vit D,25 Hydroxy  18 ng/mL  Low    

 

 TSH  2.77 uIU/mL    0.35-4.94  

 

 PSA  10.860 Results v <SEE NOTE> ng/mL  High  0.000-4.000  23









 Laboratory test finding  2015  Vitamin B12  170 pg/mL  Low  180-914  24

 

 Laboratory test finding  2015  B Natriuretic Pep  174 pg/mL  High  (0-100
)  24, 25

 

 Lipid  2015  Cholesterol  148 mg/dL      24









 Triglycerides  111 mg/dL      24

 

 HDL  44 mg/dL    29-  24, 26

 

 Chol/ HDL Ratio  3.4 ratio  Low  4.0-6.7  24

 

 VLDL  22 mg/dL    2-29  24

 

 LDL (Calc)  82 mg/dL    20-99  24, 27









 CBC With Auto Diff  2015  WBC  6.3 K/uL    4.1-11.0  24









 RBC  4.80 M/uL    4.60-6.10  24

 

 Hemoglobin  16.2 gm/dL    13.5-18.0  24

 

 Hematocrit  49.1 %    41.0-53.0  24

 

 MCV  102.3 fL  High  80.0-97.0  24

 

 MCH  33.7 pg  High  27.0-32.0  24

 

 MCHC  32.9 g/dL    32.0-36.0  24

 

 RDW  14.0 %    11.5-14.5  24

 

 PLT Count  245 K/ul    140-400  24

 

 Neutrophil  63.3 %    35.0-75.0  24

 

 Lymphocyte  24.2 %    16.0-52.0  24

 

 Monocyte  8.4 %    2.0-10.0  24

 

 Eosinophil  3.1 %    0.0-5.0  24

 

 Basophil  1.0 %    0.0-4.0  24

 

 Abs Neutrophils  4.0 K/uL    2.1-8.0  24

 

 Abs Lymphocytes  1.5 K/uL    0.8-5.5  24

 

 Abmon  0.5 K/uL    0.1-1.0  24

 

 Abs Eosinophils  0.2 K/uL    0.0-0.5  24

 

 Abs Basophils  0.1 K/uL    0.0-0.3  24









 Comprehensive Metabolic (CMP)  2015  Sodium  137 mmol/L    134-142  24









 Potassium  4.4 mmol/L    3.5-5.2  24

 

 Chloride  104 mmol/L      24

 

 Carbon Dioxide  28 mmol/L    24-34  24

 

 Glucose  99 mg/dL      24

 

 BUN  12 mg/dL    6-26  24

 

 Creatinine  1.1 mg/dL    0.5-1.4  24

 

 Calcium  9.3 mg/dL    8.5-10.2  24

 

 Total Protein  6.3 g/dL    6.0-8.0  24

 

 Albumin  3.7 g/dL    3.6-4.9  24

 

 Globulin  2.6 g/dL    2.0-3.5  24

 

 A/G Ratio  1.4 Ratio    1.0-2.2  24

 

 Total Bilirubin  0.6 mg/dL    0.1-1.3  24

 

 Alkaline Phosphatase  65 U/L      24

 

 Alt  11 U/L    3-42  24

 

 Ast  14 U/L    8-42  24

 

 Anion Gap  9 mmol/L    6-14  24

 

  Kristin Egfr  >60    >60  24, 28

 

 Non  Kristin Egfr  >60    >60  24, 29









 CBC With Auto Diff  2015  WBC  6.6 K/uL    4.1-11.0  30









 RBC  4.03 M/uL  Low  4.60-6.10  30

 

 Hemoglobin  14.3 gm/dL    13.5-18.0  30

 

 Hematocrit  43.2 %    41.0-53.0  30

 

 MCV  107.1 fL  High  80.0-97.0  30

 

 MCH  35.6 pg  High  27.0-32.0  30

 

 MCHC  33.2 g/dL    32.0-36.0  30

 

 RDW  15.2 %  High  11.5-14.5  30

 

 PLT Count  315 K/ul    140-400  30

 

 Neutrophil  68.3 %    35.0-75.0  30

 

 Lymphocyte  17.7 %    16.0-52.0  30

 

 Monocyte  10.2 %  High  2.0-10.0  30

 

 Eosinophil  3.1 %    0.0-5.0  30

 

 Basophil  0.7 %    0.0-4.0  30

 

 Abs Neutrophils  4.5 K/uL    2.1-8.0  30

 

 Abs Lymphocytes  1.2 K/uL    0.8-5.5  30

 

 Abmon  0.7 K/uL    0.1-1.0  30

 

 Abs Eosinophils  0.2 K/uL    0.0-0.5  30

 

 Abs Basophils  0.0 K/uL    0.0-0.3  30









 Laboratory test finding  2015  B Natriuretic Pep  108 pg/mL  High  (0-100
)  

 

 Comprehensive Metabolic (CMP)  10/31/2014  Sodium  139 mmol/L    134-142  30









 Potassium  4.4 mmol/L    3.5-5.2  30

 

 Chloride  104 mmol/L      30

 

 Carbon Dioxide  28 mmol/L    24-34  30

 

 Glucose  86 mg/dL      30

 

 BUN  13 mg/dL    6-26  30

 

 Creatinine  0.9 mg/dL    0.5-1.4  30

 

 Calcium  9.0 mg/dL    8.5-10.2  30

 

 Total Protein  6.4 g/dL    6.0-8.0  30

 

 Albumin  3.9 g/dL    3.6-4.9  30

 

 Globulin  2.5 g/dL    2.0-3.5  30

 

 A/G Ratio  1.6 Ratio    1.0-2.2  30

 

 Total Bilirubin  0.8 mg/dL    0.1-1.3  30

 

 Alkaline Phosphatase  54 U/L      30

 

 Alt  15 U/L    3-42  30

 

 Ast  16 U/L    8-42  30

 

 Anion Gap  11 mmol/L    6-14  30

 

  Kristin Egfr  >60    >60  30, 31

 

 Non  Kristin Egfr  >60    >60  30, 32









 CBC With Auto Diff  10/31/2014  WBC  6.5 K/uL    4.1-11.0  30









 RBC  4.50 M/uL  Low  4.60-6.10  30

 

 Hemoglobin  15.8 gm/dL    13.5-18.0  30

 

 Hematocrit  46.9 %    41.0-53.0  30

 

 MCV  104.2 fL  High  80.0-97.0  30

 

 MCH  35.2 pg  High  27.0-32.0  30

 

 MCHC  33.8 g/dL    32.0-36.0  30

 

 RDW  14.2 %    11.5-14.5  30

 

 PLT Count  238 K/ul    140-400  30

 

 Neutrophil  59.1 %    35.0-75.0  30

 

 Lymphocyte  27.7 %    16.0-52.0  30

 

 Monocyte  9.0 %    2.0-10.0  30

 

 Eosinophil  3.3 %    0.0-5.0  30

 

 Basophil  0.9 %    0.0-4.0  30

 

 Abs Neutrophils  3.8 K/uL    2.1-8.0  30

 

 Abs Lymphocytes  1.8 K/uL    0.8-5.5  30

 

 Abmon  0.6 K/uL    0.1-1.0  30

 

 Abs Eosinophils  0.2 K/uL    0.0-0.5  30

 

 Abs Basophils  0.1 K/uL    0.0-0.3  30









 Laboratory test finding  10/31/2014  Uric Acid  8.4 mg/dL    2.6-8.4  30

 

 Lipid  10/31/2014  Cholesterol  152 mg/dL      30









 Triglycerides  130 mg/dL      30

 

 HDL  44 mg/dL    29-71  30, 33

 

 Chol/ HDL Ratio  3.5 ratio  Low  4.0-6.7  30

 

 VLDL  26 mg/dL    2-29  30

 

 LDL (Calc)  82 mg/dL    20-99  30, 34









 Laboratory test finding  10/31/2014  TSH  2.50 uIU/mL    0.34-5.60  30









 Vitamin B12  182 pg/mL    180-914  30









 Laboratory test  10/31/2014  B Natriuretic Pep  131 pg/mL  High  (0-100)  30, 
35



 finding            

 

 Laboratory test  02/10/2014  Urine Culture  Microbiology res      36, 37



 finding      <SEE NOTE>      

 

 Comprehensive  2013  Sodium  139 mmol/L    134-142  36



 Metabolic (CMP)            









 Potassium  4.5 mmol/L    3.5-5.2  36

 

 Chloride  103 mmol/L      36

 

 Carbon Dioxide  32 mmol/L    24-34  36

 

 Glucose  103 mg/dL      36

 

 BUN  17 mg/dL    6-26  36

 

 Creatinine  1.2 mg/dL    0.5-1.4  36

 

 Calcium  9.2 mg/dL    8.5-10.2  36

 

 Total Protein  6.6 g/dL    6.0-8.0  36

 

 Albumin  4.0 g/dL    3.6-4.9  36

 

 Globulin  2.6 g/dL    2.0-3.5  36

 

 A/G Ratio  1.5 Ratio    1.0-2.2  36

 

 Total Bilirubin  0.6 mg/dL    0.1-1.3  36

 

 Alkaline Phosphatase  64 U/L      36

 

 Alt  15 U/L    3-42  36

 

 Ast  14 U/L    8-42  36

 

 Anion Gap  9 mmol/L    6-14  36

 

  Kristin Egfr  >60    >60  36, 38

 

 Non  Kristin Egfr  57  Low  >60  36, 39









 CBC With Auto Diff  2013  WBC  6.9 K/uL    4.1-11.0  36









 RBC  4.51 M/uL  Low  4.60-6.10  36

 

 Hemoglobin  15.4 gm/dL    13.5-18.0  36

 

 Hematocrit  46.9 %    41.0-53.0  36

 

 MCV  103.9 fL  High  80.0-97.0  36

 

 MCH  34.1 pg  High  27.0-32.0  36

 

 MCHC  32.8 g/dL    32.0-36.0  36

 

 RDW  13.8 %    11.5-14.5  36

 

 PLT Count  286 K/ul    140-400  36

 

 Neutrophil  59.4 %    35.0-75.0  36

 

 Lymphocyte  26.2 %    16.0-52.0  36

 

 Monocyte  9.7 %    2.0-10.0  36

 

 Eosinophil  4.0 %    0.0-5.0  36

 

 Basophil  0.7 %    0.0-4.0  36

 

 Abs Neutrophils  4.1 K/uL    2.1-8.0  36

 

 Abs Lymphocytes  1.8 K/uL    0.8-5.5  36

 

 Abs Monocytes  0.7 K/uL    0.1-1.0  36

 

 Abs Eosinophils  0.3 K/uL    0.0-0.5  36

 

 Abs Basophils  0.0 K/uL    0.0-0.3  36









 Laboratory test finding  2013  Uric Acid  8.1 mg/dL    2.6-8.4  36

 

 Laboratory test finding  2013  B Natriuretic Pep  35 pg/mL    (0-100)  36
, 40

 

 Comprehensive Metabolic  09/10/2012  Sodium  139 mmol/L    134-142  36



 (CMP)            









 Potassium  5.2 mmol/L    3.5-5.2  36

 

 Chloride  103 mmol/L      36

 

 Carbon Dioxide  33 mmol/L    24-34  36

 

 Glucose  97 mg/dL      36

 

 BUN  11 mg/dL    6-26  36

 

 Creatinine  0.9 mg/dL    0.5-1.4  36

 

 Calcium  9.5 mg/dL    8.5-10.2  36

 

 Total Protein  6.5 g/dL    6.0-8.0  36

 

 Albumin  4.0 g/dL    3.6-4.9  36

 

 Globulin  2.5 g/dL    2.0-3.5  36

 

 A/G Ratio  1.6 Ratio    1.0-2.2  36

 

 Total Bilirubin  0.7 mg/dL    0.1-1.3  36

 

 Alkaline Phosphatase  55 U/L      36

 

 Alt  14 U/L    3-42  36

 

 Ast  14 U/L    8-42  36

 

 Anion Gap  8 mmol/L    6-14  36

 

  Kristin Egfr  >60    >60  36, 41

 

 Non  Kristin Egfr  >60    >60  36, 42









 CBC With Auto Diff  09/10/2012  WBC  6.2 K/uL    4.1-11.0  36









 RBC  4.58 M/uL  Low  4.60-6.10  36

 

 Hemoglobin  15.7 gm/dL    13.5-18.0  36

 

 Hematocrit  46.5 %    41.0-53.0  36

 

 MCV  101.5 fL  High  80.0-97.0  36

 

 MCH  34.3 pg  High  27.0-32.0  36

 

 MCHC  33.8 g/dL    32.0-36.0  36

 

 RDW  14.4 %    11.5-14.5  36

 

 PLT Count  232 K/ul    140-400  36

 

 Neutrophil  61.0 %    35.0-75.0  36

 

 Lymphocyte  24.9 %    16.0-52.0  36

 

 Monocyte  8.4 %    2.0-10.0  36

 

 Eosinophil  4.9 %    0.0-5.0  36

 

 Basophil  0.8 %    0.0-4.0  36

 

 Abs Neutrophils  3.8 K/uL    2.1-8.0  36

 

 Abs Lymphocytes  1.5 K/uL    0.8-5.5  36

 

 Abs Monocytes  0.5 K/uL    0.1-1.0  36

 

 Abs Eosinophils  0.3 K/uL    0.0-0.5  36

 

 Abs Basophils  0.1 K/uL    0.0-0.3  36









 Laboratory test  09/10/2012  TSH  2.58 uIU/mL    0.34-5.60  36



 finding            

 

 Laboratory test  09/10/2012  B Natriuretic Pep  84 pg/mL    (0-100)  36, 43



 finding            

 

 Laboratory test  2012  Urine Culture  Microbiology res      36, 44



 finding      <SEE NOTE>      

 

 Laboratory test  2012  Bilirubin Urine  NEGATIVE    (Neg)  36, 45



 finding            

 

 Urine Microscopic  2012  Urine WBC  3-5 /HPF    (0-5)  36



 Only -RL            









 Urine RBC  * >150 /HPF    (0-2)  36

 

 Bacteria  1+ /HPF      36, 46









 Urinalysis-RL  2012  Color  GILBERT      36









 Appearance  CLOUDY      36

 

 Spec Grav Urine  1.019    (1.003-1.030)  36

 

 PH Urine  6.0    (5.0-7.5)  36

 

 Leuk Esterase  TRACE    (Neg)  36

 

 Nitrite Urine  NEGATIVE    (Neg)  36

 

 Protein Urine  NEGATIVE    (Neg)  36

 

 Glucose Urine  NEGATIVE    (Neg)  36

 

 Ketone Urine  NEGATIVE    (Neg)  36

 

 Urobilinogen  0.2 mg/dL    (0-1.0)  36

 

 Blood/HGB Urine  3+    (Neg)  36, 47









 Comprehensive Metabolic (CMP)  2011  Sodium  138 mmol/L    135-144  48









 Potassium  4.7 mmol/L    3.6-5.2  48

 

 Chloride  102 mmol/L      48

 

 Carbon Dioxide  29 mmol/L    23-32  48

 

 Glucose  96 mg/dL      48

 

 BUN  11 mg/dL    6-22  48

 

 Creatinine  1.0 mg/dL    0.5-1.3  48

 

 Calcium  9.5 mg/dL    8.6-10.2  48

 

 BUN/CR  11 ratio  Low  12-20  48

 

 Total Protein  6.4 g/dL    5.8-7.8  48

 

 Albumin  3.7 g/dL    3.5-4.8  48

 

 Globulin  2.7 g/dL    2.0-3.5  48

 

 A/G Ratio  1.4 Ratio    1.0-2.2  48

 

 Total Bilirubin  1.0 mg/dL    0.3-1.2  48

 

 Alkaline Phosphatase  47 U/L      48

 

 Alt  19 U/L    5-45  48

 

 Ast  21 U/L    12-40  48

 

 Anion Gap  12 mmol/L    8-16  48

 

 Non  Kristin Egfr  >60    >60  48, 49

 

  Kristin Egfr  >60    >60  48, 50









 Lipid  2011  Cholesterol  191 mg/dL      48









 Triglycerides  227 mg/dL  High    48

 

 HDL  44 mg/dL    29-71  48, 51

 

 Chol/ HDL Ratio  4.3 ratio    4.0-6.7  48

 

 VLDL  45 mg/dL  High  2-29  48

 

 LDL (Calc)  102 mg/dL      48, 52









 Laboratory test  2011  TSH  2.77 uIU/mL    0.34-5.60  48



 finding            

 

 Laboratory test  2011  B Natriuretic Pep  57 pg/mL    (0-100)  48, 53



 finding            

 

 PSA Total And Free -RL  2011  PSA Total  6.4 NG/ML  High  (0.0-4.0)  48









 PSA Free  0.4 NG/ML      48

 

 PSA % Free  6 %      48, 54









 PSA Total And Free -RL  2011  PSA Total  5.8 NG/ML  High  (0.0-4.0)  55









 PSA Free  0.4 NG/ML      55

 

 PSA % Free  7 %      55, 56









 PT And Inr  2010  Prothrombin Time  11.4 SEC    10.2-12.0  57, 58









 Inr  0.98  Low  2.00-3.0  57, 59









 CMP  2010  Sodium  139 mmol/L    135-144  60









 Potassium  5.3 mmol/L  High  3.6-5.2  60

 

 Chloride  103 mmol/L      60

 

 Carbon Dioxide  30 mmol/L    23-32  60

 

 Glucose  98 mg/dL      60

 

 BUN  8 mg/dL    6-22  60

 

 Creatinine  1.1 mg/dL    0.5-1.3  60

 

 BUN/CR  7 Ratio      60

 

 Calcium  9.3 mg/dL    8.6-10.2  60, 61

 

 Total Protein  6.3 g/dL    5.8-7.8  60

 

 Albumin  3.6 g/dL    3.5-4.8  60

 

 Globulin  2.7 g/dL    2.0-3.5  60

 

 A/G Ratio  1.3 Ratio    1.0-2.2  60

 

 Total Bilirubin  1.1 mg/dL    0.3-1.2  60

 

 Alkaline Phosphatase  52 U/L      60

 

 Alt  19 U/L    5-45  60

 

 Ast  17 U/L    12-40  60

 

 Anion Gap  11 mmol/L    8-16  60

 

 GFR Calculation  > 60 mL/min      60, 62

 

 GFR For African American  > 60 mL/min      60, 63









 CBC With Auto Diff  2010  WBC  6.2 K/ul    4.0-10.9  60









 RBC  4.45 M/ul  Low  4.70-6.10  60

 

 Hemoglobin  15.8 GM/dl    13.5-18.0  60

 

 Hematocrit  45.4 %    42.0-52.0  60

 

 MCV  102.0 FL  High  80.0-97.0  60

 

 MCH  35.4 pg  High  27.0-31.0  60

 

 MCHC  34.7 g/dL    32.0-36.0  60

 

 RDW  13.2 %    11.5-14.5  60

 

 Platelet Count  241 K/ul    140-440  60

 

 Neutrophils  53.1 %    50-70  60

 

 Lymphocytes  29.2 %    20-44  60

 

 Monocytes  9.9 %  High  2-9  60

 

 Eosinophil  7.2 %  High  0-4  60

 

 Basophil  0.6 %    0-2  60

 

 Absolute Neutrophils  3.3 K/ul    2.05-7.63  60

 

 Absolute Lymphocytes  1.8 K/ul    0.8-4.8  60

 

 Absolute Monocytes  0.6 K/ul    0.1-1.0  60

 

 Absolute Eosinophils  0.4 K/ul    0.1-0.5  60

 

 Absolute Basophils  0.0 K/ul    0.0-0.3  60

 

 Hematology Comment (Comm2)  N/A      60









 Lipid Panel  2010  Cholesterol  168 mg/dL      60









 Triglycerides  126 mg/dL      60

 

 HDL  49 mg/dL    29-71  60, 64

 

 Chol/HDL Ratio  3.4 Ratio  Low  4.0-6.7  60, 65

 

 VLDL  25 mg/dL    2-29  60

 

 LDL (Calc)  94 mg/dL      60, 66









 Laboratory test finding  2010  Free T4  0.95 ng/dL    0.50-1.60  60









 TSH  2.73 uIU/ml    0.34-5.60  60

 

 PSA  5.67 ng/ml  High  0.00-4.00  60, 67

 

 Magnesium  2.0 mg/dL    1.8-2.5  60









 Laboratory test finding  2009  TSH  3.54 uIU/ml    0.34-5.60  68









 PSA  4.66 ng/ml  High  0.00-4.00  68, 69









 Lipid TX Panel  2009  Ast  17 U/L    12-40  68









 Alt  19 U/L    5-45  68

 

 Cholesterol  190 mg/dL      68

 

 Triglycerides  74 mg/dL      68

 

 HDL  50 mg/dL    29-71  68, 70

 

 LDL (Calc)  125 mg/dL      68, 71

 

 Chol/HDL Ratio  3.8 Ratio  Low  4.0-6.7  68, 72

 

 VLDL  15 mg/dL    2-29  68









 CBC With Auto Diff  2009  WBC  6.6 K/ul    4.0-10.9  68









 RBC  4.62 M/ul  Low  4.70-6.10  68

 

 Hemoglobin  15.8 GM/dl    13.5-18.0  68

 

 Hematocrit  46.8 %    42.0-52.0  68

 

 MCV  101.1 FL  High  80.0-97.0  68

 

 MCH  34.1 pg  High  27.0-31.0  68

 

 MCHC  33.7 g/dL    32.0-36.0  68

 

 RDW  13.8 %    11.5-14.5  68

 

 Platelet Count  238 K/ul    140-440  68

 

 Neutrophils  50.6 %    50-70  68

 

 Lymphocytes  33.0 %    20-44  68

 

 Monocytes  9.5 %  High  2-9  68

 

 Eosinophil  5.5 %  High  0-4  68

 

 Basophil  1.4 %    0-2  68

 

 Absolute Neutrophils  3.3 K/ul    2.05-7.63  68

 

 Absolute Lymphocytes  2.2 K/ul    0.8-4.8  68

 

 Absolute Monocytes  0.6 K/ul    0.1-1.0  68

 

 Absolute Eosinophils  0.4 K/ul    0.1-0.5  68

 

 Absolute Basophils  0.1 K/ul    0.0-0.3  68

 

 Hematology Comment (Comm2)  N/A      68









 CMP  2009  Sodium  140 mmol/L    135-144  68









 Potassium  4.9 mmol/L    3.6-5.2  68

 

 Chloride  105 mmol/L      68

 

 Carbon Dioxide  29 mmol/L    23-32  68

 

 Glucose  95 mg/dL      68

 

 BUN  8 mg/dL    6-22  68

 

 Creatinine  1.2 mg/dL    0.5-1.3  68

 

 BUN/CR  7 Ratio  Low  12.0-20.0  68

 

 Calcium  10.0 mg/dL    8.6-10.2  68, 73

 

 Total Protein  6.5 g/dL    5.8-7.8  68

 

 Albumin  3.8 g/dL    3.5-4.8  68

 

 Globulin  2.7 g/dL    2.0-3.5  68

 

 A/G Ratio  1.4 Ratio    1.0-2.2  68

 

 Total Bilirubin  0.9 mg/dL    0.3-1.2  68

 

 Alkaline Phosphatase  52 U/L      68

 

 Alt  19 U/L    5-45  68

 

 Ast  17 U/L    12-40  68

 

 Anion Gap  11 mmol/L    8-16  68

 

 GFR Calculation  > 60 mL/min      68, 74

 

 GFR For African American  > 60 mL/min      68, 75









 CMP  2007  Sodium  141 mmol/L    135-144  76









 Potassium  4.4 mmol/L    3.6-5.2  76

 

 Chloride  105 mmol/L      76

 

 Carbon Dioxide  30 mmol/L    23-33  76

 

 Glucose  86 mg/dL      76

 

 BUN  11 mg/dL    6-22  76

 

 Creatinine  1.2 mg/dL    0.5-1.3  76

 

 BUN/CR  9 Ratio  Low  12.0-20.0  76

 

 Calcium  8.8 mg/dL    8.6-10.2  76, 77

 

 Total Protein  6.4 g/dL    5.8-7.8  76

 

 Albumin  3.8 g/dL    3.5-4.8  76

 

 Globulin  2.6 g/dL    2.0-3.5  76

 

 A/G Ratio  1.5 Ratio    1.0-2.2  76

 

 Total Bilirubin  0.8 mg/dL    0.3-1.2  76, 78

 

 Alkaline Phosphatase  56 U/L      76

 

 Alt  18 U/L    4-45  76

 

 Ast  20 U/L    12-40  76

 

 Anion Gap  10 mmol/L    8-16  76

 

 GFR Calculation  > 60 mL/min      76, 79

 

 GFR For African American  > 60 mL/min      76, 80









 CBC With Auto Diff  2007  WBC  5.7 K/ul    4.0-10.9  76









 RBC  4.72 M/ul    4.70-6.10  76

 

 Hemoglobin  16.3 GM/dl    13.5-18.0  76

 

 Hematocrit  46.6 %    42.0-52.0  76

 

 MCV  98.7 FL  High  80.0-97.0  76

 

 MCH  34.6 pg  High  27.0-31.0  76

 

 MCHC  35.0 g/dL    32.0-36.0  76

 

 RDW  13.0 %    11.5-14.5  76

 

 Platelet Count  251 K/ul    140-440  76

 

 Neutrophils  54.7 %    50-70  76

 

 Lymphocytes  30.8 %    20-44  76

 

 Monocytes  10.0 %  High  2-9  76

 

 Eosinophil  3.7 %    0-4  76

 

 Basophil  0.8 %    0-2  76

 

 Absolute Neutrophils  3.1 K/ul    2.05-7.63  76

 

 Absolute Lymphocytes  1.8 K/ul    0.8-4.8  76

 

 Absolute Monocytes  0.6 K/ul    0.1-1.0  76

 

 Absolute Eosinophils  0.2 K/ul    0.1-0.5  76

 

 Absolute Basophils  0.0 K/ul  Low  0.1-0.3  76









 Lipid Panel  2007  Cholesterol  183 mg/dL      76









 Triglycerides  86 mg/dL      76

 

 HDL  41 mg/dL    29-71  76

 

 Chol/HDL Ratio  4.5 Ratio      76

 

 VLDL  17 mg/dL      76

 

 LDL (Calc)  125 mg/dL      76









 Laboratory test finding  2007  TSH  2.05 uIU/ml    0.34-5.60  76

 

 PSA Free & Total -LA  2007  PSA, Total-LA  3.4 NG/ML    0.0-4.0  76









 PSA, Free  0.3 NG/ML      76

 

 PSA, % Free - [place]  9 %      76, 81









 Urinalysis -RL  06/15/2006  Color -LA  YELLOW      









 Appearance -LA  CLEAR      

 

 Specific Gravity -LA  1.015    (1.003-1.030)  

 

 Nitrite -LA  NEGATIVE    (Neg)  

 

 PH Urine -LA  6    (5.0-7.5)  

 

 Protein Urine-LA  NEGATIVE    (Neg)  

 

 Glucose Urine -LA  NEGATIVE    (Neg)  

 

 Ketone Urine -LA  NEGATIVE    (Neg)  

 

 Urobilinogen -LA  NORMAL    (Norm)  

 

 Bilirubin Urine -LA  NEGATIVE    (Neg)  

 

 Blood Urine -LA  NEGATIVE      









 Urine Microscopic Only -RL  06/15/2006  Urine WBC -LA  6-10      









 Urine RBC -LA  0-2      

 

 Epithelial Cells -LA  1+      

 

 Bacteria -LA  1+      

 

 Mucus -LA  2+      









 CBC With Auto Diff  06/15/2006  WBC  8.6 K/ul    4.0-10.9  









 RBC  4.73 M/ul    4.70-6.10  

 

 Hemoglobin  16.3 GM/dl    13.5-18.0  

 

 Hematocrit  46.5 %    42.0-52.0  

 

 MCV  98.1 FL  High  80.0-97.0  

 

 MCH  34.4 pg  High  27.0-31.0  

 

 MCHC  35.0 g/dL    32.0-36.0  

 

 RDW  13.6 %    11.5-14.5  

 

 Platelet Count  272 K/ul    140-440  

 

 Neutrophils  68.5 %    50-70  

 

 Lymphocytes  18.9 %  Low  20-44  

 

 Monocytes  9.9 %  High  2-9  

 

 Eosinophil  2.4 %    0-4  

 

 Basophil  0.3 %    0-2  

 

 Absolute Neutrophils  5.9 K/ul    2.05-7.63  

 

 Absolute Lymphocytes  1.6 K/ul    0.8-4.8  

 

 Absolute Monocytes  0.9 K/ul    0.1-1.0  

 

 Absolute Eosinophils  0.2 K/ul    0.1-0.5  

 

 Absolute Basophils  0.0 K/ul  Low  0.1-0.3  









 CMP  06/15/2006  Sodium  140 mmol/L    135-144  









 Potassium  4.0 mmol/L    3.6-5.2  

 

 Chloride  105 mmol/L      

 

 Carbon Dioxide  25 mmol/L    23-33  

 

 Glucose  90 mg/dL      

 

 BUN  14 mg/dL    6-22  

 

 Creatinine  1.2 mg/dL    0.5-1.3  

 

 BUN/CR  12 Ratio    12.0-20.0  

 

 Calcium  8.4 mg/dL  Low  8.6-10.2  82

 

 Total Protein  6.8 g/dL    5.8-7.8  

 

 Albumin  3.9 g/dL    3.5-4.8  

 

 Globulin  2.9 g/dL    2.0-3.5  

 

 A/G Ratio  1.3 Ratio    1.0-2.2  

 

 Total Bilirubin  1.5 mg/dL  High  0.3-1.2  

 

 Alkaline Phosphatase  67 U/L      

 

 Alt  29 U/L    4-45  

 

 Ast  29 U/L    12-40  

 

 Anion Gap  14 mmol/L    8-16  

 

 GFR White Male  62      

 

 GFR White Female  46      

 

 GFR Black Male  75      

 

 GFR Black Female  56      

 

 GFR Guidelines  0      83









 Laboratory test finding  06/15/2006  Amylase  42 U/L      

 

 PSA, Free And Total  2006  Prostate-Specific Antigen  2.2 ng/ml    0.0-
4.0  84



     (PSA) -[facility]        









 PSA, Free -[facility]  0.27 ng/ml      

 

 PSA, Percent Free - [facility]  12.3 %      85









 CBC  2005  WBC  6.9 K/ul    4.1-10.9  76









 RBC  4.92 M/ul    4.20-6.30  76

 

 Hemoglobin  16.6 GM/dl  High  12.0-16.0  76

 

 Hematocrit  49.1 %    41.0-53.0  76

 

 MCV  99.7 FL  High  80.0-97.0  76

 

 MCH  33.7 pg  High  26.0-32.0  76

 

 MCHC  33.8 g/dL    31.0-36.0  76

 

 RDW  13.1 %    11.5-14.5  76

 

 Platelet Count  305 K/ul    140-440  76

 

 Neutrophils  48.8 %  Low  50-70  76

 

 Lymphocytes  34.7 %    20-44  76

 

 Monocytes  9.4 %  High  2-9  76

 

 Eosinophil  6.7 %  High  0-4  76

 

 Basophil  0.4 %    0-2  76

 

 Absolute Neutrophils  3.4 K/ul    2.05-7.63  76

 

 Absolute Lymphocytes  2.4 K/ul    0.8-4.8  76

 

 Absolute Monocytes  0.6 K/ul    0.1-1.0  76

 

 Absolute Eosinophils  0.5 K/ul    0.1-0.5  76

 

 Absolute Basophils  0.0 K/ul  Low  0.1-0.3  76









 Laboratory test finding  2005  PSA  3.89 ng/ml    0.00-4.00  76, 86

 

 CMP  2005  Sodium  141 mmol/L    135-144  76









 Potassium  5.0 mmol/L    3.6-5.2  76

 

 Chloride  106 mmol/L      76

 

 Carbon Dioxide  30 mmol/L    22-32  76

 

 Glucose  85 mg/dL      76

 

 BUN  12 mg/dL    6-22  76

 

 Creatinine  1.2 mg/dL    0.5-1.3  76

 

 BUN/CR  10 Ratio  Low  12.0-20.0  76

 

 Calcium  9.2 mg/dL    8.6-10.2  76, 87

 

 Total Protein  6.9 g/dL    5.8-7.8  76

 

 Albumin  3.9 g/dL    3.5-4.8  76

 

 Globulin  3.0 g/dL    2.0-3.5  76

 

 A/G Ratio  1.3 Ratio    1.0-2.2  76

 

 Total Bilirubin  0.9 mg/dL    0.3-1.2  76

 

 Ast  20 U/L    12-40  76

 

 Alt  23 U/L    4-45  76

 

 Alkaline Phosphatase  68 U/L    32-91  76

 

 Anion Gap  10 mmol/L    8-16  76









 Lipid Panel  2005  Cholesterol  176 mg/dL      76









 Triglycerides  129 mg/dL      76

 

 HDL  43 mg/dL    29-71  76

 

 Chol/HDL Ratio  4.1 Ratio      76

 

 VLDL  26 mg/dL      76

 

 LDL (Calc)  107 mg/dL  High    76









 Laboratory test finding  2005  Amylase  54 U/L      76

 

 CBC  10/13/2004  WBC  6.0 K/ul    4.1-10.9  









 RBC  4.72 M/ul    4.20-6.30  

 

 Hemoglobin  15.8 GM/dl    12.0-16.0  

 

 Hematocrit  46.2 %    37.0-51.0  

 

 MCV  97.9 FL  High  80.0-97.0  

 

 MCH  33.4 pg  High  26.0-32.0  

 

 MCHC  34.1 g/dL    31.0-36.0  

 

 RDW  12.7 %    11.5-14.5  

 

 Platelet Count  253 K/ul    140-440  

 

 Neutrophils  48.1 %  Low  50-70  

 

 Lymphocytes  36.5 %    20-44  

 

 Monocytes  10.7 %  High  2-9  

 

 Eosinophil  3.9 %    0-4  

 

 Basophil  0.8 %    0-2  

 

 Absolute Neutrophils  3.0 K/ul    2.05-7.63  

 

 Absolute Lymphocytes  2.2 K/ul    0.8-4.8  

 

 Absolute Monocytes  0.6 K/ul    0.1-1.0  

 

 Absolute Eosinophils  0.2 K/ul    0.1-0.5  

 

 Absolute Basophils  0.0 K/ul  Low  0.1-0.3  









 Laboratory test finding  10/13/2004  PSA  2.23 ng/ml    0.00-4.00  88

 

 CMP  10/13/2004  Sodium  141 mmol/L    135-145  









 Potassium  5.0 mmol/L    3.4-5.3  

 

 Chloride  106 mmol/L      

 

 Carbon Dioxide  30 mmol/L    22-33  

 

 Glucose  90 mg/dL      

 

 BUN  11 mg/dL    6-26  

 

 Creatinine  1.0 mg/dL    0.5-1.5  

 

 BUN/CR  11 Ratio  Low  12.0-20.0  

 

 Calcium  8.8 mg/dL    8.6-10.3  

 

 Total Protein  6.8 g/dL    6.2-8.3  

 

 Albumin  3.9 g/dL    3.5-5.0  

 

 Globulin  2.9 g/dL    2.7-4.3  

 

 A/G Ratio  1.3 Ratio    1.0-2.2  

 

 Total Bilirubin  0.9 mg/dL    0.1-1.3  

 

 Ast  20 U/L    8-42  

 

 Alt  20 U/L    3-42  

 

 Alkaline Phosphatase  58 U/L      

 

 Anion Gap  10 mmol/L    10-20  









 CBC  2004  WBC  5.7 K/ul    4.1-10.9  









 RBC  4.92 M/ul    4.20-6.30  

 

 Hemoglobin  16.4 GM/dl  High  12.0-16.0  

 

 Hematocrit  48.5 %    37.0-51.0  

 

 MCV  98.5 FL  High  80.0-97.0  

 

 MCH  33.3 pg  High  26.0-32.0  

 

 MCHC  33.8 g/dL    31.0-36.0  

 

 RDW  12.7 %    11.5-14.5  

 

 Platelet Count  228 K/ul    140-440  

 

 Neutrophils  53.5 %    50-70  

 

 Lymphocytes  31.8 %    20-44  

 

 Monocytes  9.0 %    2-9  

 

 Eosinophil  5.4 %  High  0-4  

 

 Basophil  0.3 %    0-2  

 

 Absolute Neutrophils  3.1 K/ul    2.05-7.63  

 

 Absolute Lymphocytes  1.8 K/ul    0.8-4.8  

 

 Absolute Monocytes  0.5 K/ul    0.1-1.0  

 

 Absolute Eosinophils  0.3 K/ul    0.1-0.5  

 

 Absolute Basophils  0.0 K/ul  Low  0.1-0.3  









 CMP  2004  Sodium  143 mmol/L    135-145  









 Potassium  4.6 mmol/L    3.4-5.3  

 

 Chloride  106 mmol/L      

 

 Carbon Dioxide  27 mmol/L    22-33  

 

 Glucose  103 mg/dL      

 

 BUN  14 mg/dL    6-26  

 

 Creatinine  1.0 mg/dL    0.5-1.5  

 

 BUN/CR  14 Ratio    12.0-20.0  

 

 Calcium  9.3 mg/dL    8.6-10.3  

 

 Total Protein  7.2 g/dL    6.2-8.3  

 

 Albumin  4.0 g/dL    3.5-5.0  

 

 Globulin  3.2 g/dL    2.7-4.3  

 

 A/G Ratio  1.3 Ratio    1.0-2.2  

 

 Total Bilirubin  1.1 mg/dL    0.1-1.3  

 

 Ast  22 U/L    8-42  

 

 Alt  20 U/L    3-42  

 

 Alkaline Phosphatase  51 U/L      

 

 Anion Gap  15 mmol/L    10-20  









 Lyme Disease, Western Blot  Serum  10/02/2003  P93 AB.  ABSENT      









 P66 AB.  ABSENT      

 

 P58 AB.  PRESENT      

 

 P45 AB.  ABSENT      

 

 P41 AB.  ABSENT      

 

 P39 AB.  ABSENT      

 

 P30 AB.  ABSENT      

 

 P28 AB.  ABSENT      

 

 P23 AB.  ABSENT      

 

 P18 AB.  ABSENT      

 

 Lyme Igg WB Interp.  Negative      89

 

 P41 AB.  ABSENT      

 

 P39 AB.  ABSENT      

 

 P23 AB.  ABSENT      

 

 Lyme Igm WB Interp.  Negative      90









 CBC  10/02/2003  WBC  7.7 K/ul    4.1-10.9  









 RBC  4.91 M/ul    4.20-6.30  

 

 Hemoglobin  16.1 GM/dl  High  12.0-16.0  

 

 Hematocrit  48.6 %    37.0-51.0  

 

 MCV  98.9 FL  High  80.0-97.0  

 

 MCH  32.7 pg  High  26.0-32.0  

 

 MCHC  33.1 g/dL    31.0-36.0  

 

 RDW  12.8 %    11.5-14.5  

 

 Platelet Count  380 K/ul    140-440  









 Laboratory test finding  10/02/2003  PSA  2.33 ng/ml    0.00-4.00  91

 

 Basic (BMP)  10/02/2003  Sodium  140 mmol/L    135-145  









 Potassium  4.3 mmol/L    3.4-5.3  

 

 Chloride  109 mmol/L      

 

 Carbon Dioxide  26 mmol/L    22-33  

 

 Glucose  107 mg/dL  High    

 

 BUN  13 mg/dL    6-26  

 

 Creatinine  1.0 mg/dL    0.5-1.5  

 

 BUN/CR  13 Ratio    12.0-20.0  

 

 Anion Gap  9 mmol/L  Low  10-20  

 

 Calcium  8.6 mg/dL    8.6-10.3  









 Lipid Panel  10/02/2003  Cholesterol  167 mg/dL      









 Triglycerides  150 mg/dL      

 

 HDL  31 mg/dL    29-71  

 

 Chol/HDL Ratio  5.4 Ratio      

 

 VLDL  30 mg/dL      

 

 LDL (Calc)  106 mg/dL      









 Diff For Manual CBC  10/02/2003  RBC Morphology  NORMAL      









 Myelocyte  1 %      

 

 Band  6 %    2-6  

 

 Neutrophil  52 %    50-70  

 

 Lymphocyte  30 %    20-44  

 

 Monocyte  5 %    2-9  

 

 Eosinophil  5 %  High  0-4  

 

 Basophil  1 %    0-2  

 

 Platelet Estimate  NORMAL      









 CMP  2002  Sodium  145. mmol/L    137-145  92









 Potassium  5.0 mmol/L    3.6-5.0  

 

 Chloride  106 mmol/L      

 

 Carbon Dioxide  28 mmol/L    22-30  

 

 Glucose  99. mg/dL      

 

 BUN  12. mg/dL    9-21  

 

 Creatinine, Serum  1.0 mg/dL    0.8-1.5  

 

 BUN/CR Ratio  12.0 Ratio    12-20  

 

 Calcium  8.8 mg/dL    8.7-10.5  

 

 Total Protein  6.7 g/dL    6.3-8.2  

 

 Albumin  3.3 g/dL  Low  3.5-5.0  

 

 Globulin  3.4 g/dL    2.7-4.3  

 

 A/G Ratio  1.0    1.0-2.2  

 

 Total Bilirubin  .7 mg/dL    0.2-1.3  

 

 Ast  31. U/L    17-59  

 

 Alt  55. U/L    21-72  

 

 Alkaline Phosphatase  53. U/L      









 CBC  2002  WBC  5.1 K/ul    4.1-10.9  









 RBC  4.77 M/ul    4.2-6.3  

 

 Hemoglobin  15.7 GM/dl    12.0-16.0  

 

 Hematocrit  47.3 %    37.0-51.0  

 

 MCV  99.2 FL  High  80-97  

 

 MCH  33.0 pg  High  26.0-32.0  

 

 MCHC  33.3 g/dL    31.0-36.0  

 

 RDW  12.4 %    11.5-14.5  

 

 Platelet Count  266 K/ul    140-440  

 

 Neutrophils  47.1 %  Low  50-70  

 

 Lymphocytes  36.9 %    20-44  

 

 Monocytes  11.7 %  High  2-9  

 

 Eosinophil  3.9 %    0-4  

 

 Basophil  0.4 %    0-2  

 

 Absolute Neutrophils  2.4 K/ul    2.05-7.63  

 

 Absolute Lymphocytes  1.9 K/ul    0.8-4.8  

 

 Absolute Monocytes  0.6 K/ul    0.1-1.0  

 

 Absolute Eosinophils  0.2 K/ul    0.1-0.5  

 

 Absolute Basophils  0.0 K/ul  Low  0.1-0.3  









 Laboratory test finding  2001  Cholesterol  196. mg/dL      









 PSA  1.4 ng/ml    0.0-4.0  93









 CMP  2001  Sodium  142. mmol/L    137-145  









 Potassium  4.2 mmol/L    3.6-5.0  

 

 Chloride  103. mmol/L      

 

 Carbon Dioxide  28. mmol/L    22-30  

 

 Glucose  81. mg/dL      

 

 BUN  10. mg/dL    9-21  

 

 Creatinine, Serum  .8 mg/dL    0.8-1.5  

 

 BUN/CR Ratio  11.7 Ratio  Low  12-20  

 

 Calcium  8.0 mg/dL  Low  8.7-10.5  

 

 Total Protein  6.8 g/dL    6.3-8.2  

 

 Albumin  3.7 g/dL    3.5-5.0  

 

 Globulin  3.1 g/dL    2.7-4.3  

 

 A/G Ratio  1.2    1.0-2.2  

 

 Total Bilirubin  .4 mg/dL    0.2-1.3  

 

 Ast  19. U/L    17-59  

 

 Alt  26. U/L    21-72  

 

 Alkaline Phosphatase  60. U/L      









 CBC  2001  WBC  6.8 K/ul    4.1-10.9  









 RBC  4.67 M/ul    4.2-6.3  

 

 Hemoglobin  15.2 GM/dl    12.0-16.0  

 

 Hematocrit  45.8 %    37.0-51.0  

 

 MCV  98.1 FL  High  80-97  

 

 MCH  32.5 pg  High  26.0-32.0  

 

 MCHC  33.1 g/dL    31.0-36.0  

 

 RDW  12.4 %    11.5-14.5  

 

 Platelet Count  267 K/ul    140-440  

 

 Neutrophils  52.5 %    50-70  

 

 Lymphocytes  29.3 %    20-44  

 

 Monocytes  11.1 %  High  2-9  

 

 Eosinophil  6.5 %  High  0-4  

 

 Basophil  0.6 %    0-2  

 

 Absolute Neutrophils  3.6 K/ul    2.05-7.63  

 

 Absolute Lymphocytes  2.0 K/ul    0.8-4.8  

 

 Absolute Monocytes  0.8 K/ul    0.1-1.0  

 

 Absolute Eosinophils  0.4 K/ul    0.1-0.5  

 

 Absolute Basophils  0.0 K/ul  Low  0.1-0.3  









 1  Testing performed by graphite furnace



   atomic absorption spectroscopy.



   



   Information for health care



   providers on lead poisoning



   prevention and management is



   available on the [facility] website.

 

 2  Updated reference range on new analyzer 3-2017

 

 3  Updated reference range on new analyzer 3-2017

 

 4  Concerning GFR Guidelines for  Americans:



   Normal function or mild renal disease, if clinically at risk:  >/=60



   mL/min



   Moderately decreased:  30-59



   Severely decreased:  15-29



   Renal failure:  <15

 

 5  Concerning GFR Guidelines:



   Normal function or mild renal disease, if clinically at risk:  >/=60



   mL/min



   Moderately decreased:  30-59



   Severely decreased:  15-29



   Renal failure:  <15



   



   Glomerular Filtration Rate (GFR) is estimated based on the MDRD



   equation, which assumes a steady state for creatinine as recommended



   by the National Kidney Disease Education Program in conjunction with



   the National Institutes of Health and the National Kidney Foundation.



   



   Clinical conditions in which it may be necessary to measure GFR by



   using clearance methods include extremes of age and body size, severe



   malnutrition or obesity, diseases of skeletal muscle, paraplegia or



   quadriplegia, vegetarian diet, rapidly changing kidney function, and



   calculation of the dose of potentially toxic drugs that are excreted



   by the kidneys.

 

 6  Updated reference range on new analyzer 3-2017

 

 7  Per NCEP ATP III Guidelines:



   Results lower than 40 mg/dL are suggestive of increased risk for



   coronary artery disease.  Results > or=to 60 mg/dL are considered a



   negative risk factor.

 

 8  Per NCEP ATP III Guidelines:



   Normal Population <130



   Patients with medical conditions: CHD/DM



   Optimal: <100



   Borderline high: 130-159



   High: 160-189



   Very high: >189

 

 9  Beginning 07 PSA values assayed at [facility] uses



   chemiluminescence methodology manufactured by [employer] for use



   on the DXI analyzer.  Values obtained with different assay methods or



   kits can not be used interchangeably.  Serum PSA measurement is not an



   absolute test for malignancy.  The PSA value should be used in



   conjunction with information available from clinical evaluation and



   other diagnostic procedures.

 

 10  2.91 Results verified by repeat analysis

 

 11  7.5 Results verified by repeat analysis

 

 12  24.2 Results verified by repeat analysis

 

 13  Concerning GFR Guidelines for  Americans:



   Normal function or mild renal disease, if clinically at risk:  >/=60



   mL/min



   Moderately decreased:  30-59



   Severely decreased:  15-29



   Renal failure:  <15

 

 14  Concerning GFR Guidelines:



   Normal function or mild renal disease, if clinically at risk:  >/=60



   mL/min



   Moderately decreased:  30-59



   Severely decreased:  15-29



   Renal failure:  <15



   



   Glomerular Filtration Rate (GFR) is estimated based on the MDRD



   equation, which assumes a steady state for creatinine as recommended



   by the National Kidney Disease Education Program in conjunction with



   the National Institutes of Health and the National Kidney Foundation.



   



   Clinical conditions in which it may be necessary to measure GFR by



   using clearance methods include extremes of age and body size, severe



   malnutrition or obesity, diseases of skeletal muscle, paraplegia or



   quadriplegia, vegetarian diet, rapidly changing kidney function, and



   calculation of the dose of potentially toxic drugs that are excreted



   by the kidneys.

 

 15  Per NCEP ATP III Guidelines:



   Results lower than 40 mg/dL are suggestive of increased risk for



   coronary artery disease.  Results > or=to 60 mg/dL are considered a



   negative risk factor.

 

 16  Per NCEP ATP III Guidelines:



   Normal Population <130



   Patients with medical conditions: CHD/DM



   Optimal: <100



   Borderline high: 130-159



   High: 160-189



   Very high: >189

 

 17  Beginning 07 PSA values assayed at [facility] uses



   chemiluminescence methodology manufactured by [employer] for use



   on the DXI analyzer.  Values obtained with different assay methods or



   kits can not be used interchangeably.  Serum PSA measurement is not an



   absolute test for malignancy.  The PSA value should be used in



   conjunction with information available from clinical evaluation and



   other diagnostic procedures.

 

 18  Microbiology results



   SOURCE



   URINE



   FINAL RESULT



   No growth

 

 19  Concerning GFR Guidelines for  Americans:



   Normal function or mild renal disease, if clinically at risk:  >/=60



   mL/min



   Moderately decreased:  30-59



   Severely decreased:  15-29



   Renal failure:  <15

 

 20  Concerning GFR Guidelines:



   Normal function or mild renal disease, if clinically at risk:  >/=60



   mL/min



   Moderately decreased:  30-59



   Severely decreased:  15-29



   Renal failure:  <15



   



   Glomerular Filtration Rate (GFR) is estimated based on the MDRD



   equation, which assumes a steady state for creatinine as recommended



   by the National Kidney Disease Education Program in conjunction with



   the National Institutes of Health and the National Kidney Foundation.



   



   Clinical conditions in which it may be necessary to measure GFR by



   using clearance methods include extremes of age and body size, severe



   malnutrition or obesity, diseases of skeletal muscle, paraplegia or



   quadriplegia, vegetarian diet, rapidly changing kidney function, and



   calculation of the dose of potentially toxic drugs that are excreted



   by the kidneys.

 

 21  Per NCEP ATP III Guidelines:



   Results lower than 40 mg/dL are suggestive of increased risk for



   coronary artery disease.  Results > or=to 60 mg/dL are considered a



   negative risk factor.

 

 22  Per NCEP ATP III Guidelines:



   Normal Population <130



   Patients with medical conditions: CHD/DM



   Optimal: <100



   Borderline high: 130-159



   High: 160-189



   Very high: >189

 

 23  10.860 Results verified by repeat analysis



   Beginning 07 PSA values assayed at [facility] uses an EIA



   methodology manufactured by [employer] for use on the DXI



   analyzer.  Values obtained with different assay methods or kits can



   not be used interchangeably.  Serum PSA measurement is not an absolute



   test for malignancy.  The PSA value should be used in conjunction



   with information available from clinical evaluation and other



   diagnostic procedures.

 

 24  This sample is drawn by:

 

 25  Unless otherwise specified, testing performed by [facility]



   [facility]  [address] [place] [address]  [id number]

 

 26  Per NCEP ATP III Guidelines:



   Results lower than 40 mg/dL are suggestive of increased risk for



   coronary artery disease.  Results > or=to 60 mg/dL are considered a



   negative risk factor.

 

 27  Per NCEP ATP III Guidelines:



   Normal Population <130



   Patients with medical conditions: CHD/DM



   Optimal: <100



   Borderline high: 130-159



   High: 160-189



   Very high: >189

 

 28  Concerning GFR Guidelines for  Americans:



   Normal function or mild renal disease, if clinically at risk:  >/=60



   mL/min



   Moderately decreased:  30-59



   Severely decreased:  15-29



   Renal failure:  <15

 

 29  Concerning GFR Guidelines:



   Normal function or mild renal disease, if clinically at risk:  >/=60



   mL/min



   Moderately decreased:  30-59



   Severely decreased:  15-29



   Renal failure:  <15



   



   Glomerular Filtration Rate (GFR) is estimated based on the MDRD



   equation, which assumes a steady state for creatinine as recommended



   by the National Kidney Disease Education Program in conjunction with



   the National Institutes of Health and the National Kidney Foundation.



   



   Clinical conditions in which it may be necessary to measure GFR by



   using clearance methods include extremes of age and body size, severe



   malnutrition or obesity, diseases of skeletal muscle, paraplegia or



   quadriplegia, vegetarian diet, rapidly changing kidney function, and



   calculation of the dose of potentially toxic drugs that are excreted



   by the kidneys.

 

 30  This sample is drawn by:NB

 

 31  Concerning GFR Guidelines for  Americans:



   Normal function or mild renal disease, if clinically at risk:  >/=60



   mL/min



   Moderately decreased:  30-59



   Severely decreased:  15-29



   Renal failure:  <15

 

 32  Concerning GFR Guidelines:



   Normal function or mild renal disease, if clinically at risk:  >/=60



   mL/min



   Moderately decreased:  30-59



   Severely decreased:  15-29



   Renal failure:  <15



   



   Glomerular Filtration Rate (GFR) is estimated based on the MDRD



   equation, which assumes a steady state for creatinine as recommended



   by the National Kidney Disease Education Program in conjunction with



   the National Institutes of Health and the National Kidney Foundation.



   



   Clinical conditions in which it may be necessary to measure GFR by



   using clearance methods include extremes of age and body size, severe



   malnutrition or obesity, diseases of skeletal muscle, paraplegia or



   quadriplegia, vegetarian diet, rapidly changing kidney function, and



   calculation of the dose of potentially toxic drugs that are excreted



   by the kidneys.

 

 33  Per NCEP ATP III Guidelines:



   Results lower than 40 mg/dL are suggestive of increased risk for



   coronary artery disease.  Results > or=to 60 mg/dL are considered a



   negative risk factor.

 

 34  Per NCEP ATP III Guidelines:



   Normal Population <130



   Patients with medical conditions: CHD/DM



   Optimal: <100



   Borderline high: 130-159



   High: 160-189



   Very high: >189

 

 35  Unless otherwise specified, testing performed by [facility]  [address] [place]  [address]

 

 36  This sample is drawn by:CT

 

 37  Microbiology results



   SOURCE



   URINE



   FINAL RESULT



   No growth

 

 38  Concerning GFR Guidelines for  Americans:



   Normal function or mild renal disease, if clinically at risk:  >/=60



   mL/min



   Moderately decreased:  30-59



   Severely decreased:  15-29



   Renal failure:  <15

 

 39  Concerning GFR Guidelines:



   Normal function or mild renal disease, if clinically at risk:  >/=60



   mL/min



   Moderately decreased:  30-59



   Severely decreased:  15-29



   Renal failure:  <15



   



   Glomerular Filtration Rate (GFR) is estimated based on the MDRD



   equation, which assumes a steady state for creatinine as recommended



   by the National Kidney Disease Education Program in conjunction with



   the National Institutes of Health and the National Kidney Foundation.



   



   Clinical conditions in which it may be necessary to measure GFR by



   using clearance methods include extremes of age and body size, severe



   malnutrition or obesity, diseases of skeletal muscle, paraplegia or



   quadriplegia, vegetarian diet, rapidly changing kidney function, and



   calculation of the dose of potentially toxic drugs that are excreted



   by the kidneys.

 

 40  Unless otherwise specified, testing performed by [facility]  [facility] [place]  [address]

 

 41  Concerning GFR Guidelines for  Americans:



   Normal function or mild renal disease, if clinically at risk:  >/=60



   mL/min



   Moderately decreased:  30-59



   Severely decreased:  15-29



   Renal failure:  <15

 

 42  Concerning GFR Guidelines:



   Normal function or mild renal disease, if clinically at risk:  >/=60



   mL/min



   Moderately decreased:  30-59



   Severely decreased:  15-29



   Renal failure:  <15



   



   Glomerular Filtration Rate (GFR) is estimated based on the MDRD



   equation, which assumes a steady state for creatinine as recommended



   by the National Kidney Disease Education Program in conjunction with



   the National Institutes of Health and the National Kidney Foundation.



   



   Clinical conditions in which it may be necessary to measure GFR by



   using clearance methods include extremes of age and body size, severe



   malnutrition or obesity, diseases of skeletal muscle, paraplegia or



   quadriplegia, vegetarian diet, rapidly changing kidney function, and



   calculation of the dose of potentially toxic drugs that are excreted



   by the kidneys.

 

 43  Unless otherwise specified, testing performed by [facility]  [facility] [place]  [address]

 

 44  Microbiology results



   SOURCE



   URINE



   RESULT



   No growth

 

 45  Unless otherwise specified, testing performed by [facility]  [facility] [place]  [address]

 

 46  Unless otherwise specified, testing performed by [facility]  [facility] [place]  [address]

 

 47  Unless otherwise specified, testing performed by [facility]  [facility] [place]  [address]

 

 48  This sample is drawn by:MM

 

 49  Concerning GFR Guidelines:



   Normal function or mild renal disease, if clinically at risk:  >/=60



   mL/min



   Moderately decreased:  30-59



   Severely decreased:  15-29



   Renal failure:  <15



   



   Glomerular Filtration Rate (GFR) is estimated based on the MDRD



   equation, which assumes a steady state for creatinine as recommended



   by the National Kidney Disease Education Program in conjunction with



   the National Institutes of Health and the National Kidney Foundation.



   



   Clinical conditions in which it may be necessary to measure GFR by



   using clearance methods include extremes of age and body size, severe



   malnutrition or obesity, diseases of skeletal muscle, paraplegia or



   quadriplegia, vegetarian diet, rapidly changing kidney function, and



   calculation of the dose of potentially toxic drugs that are excreted



   by the kidneys.

 

 50  Concerning GFR Guidelines for  Americans:



   Normal function or mild renal disease, if clinically at risk:  >/=60



   mL/min



   Moderately decreased:  30-59



   Severely decreased:  15-29



   Renal failure:  <15

 

 51  Per NCEP ATP III Guidelines:



   Results lower than 40 mg/dL are suggestive of increased risk for



   coronary artery disease.  Results > or=to 60 mg/dL are considered a



   negative risk factor.

 

 52  Per NCEP ATP III Guidelines:



   Optimal: <100



   Near optimal: 100-129



   Borderline high: 130-159



   High: 160-189



   Very high: >189

 

 53  Unless otherwise specified, testing performed by [facility]  [facility] [place]  [address]

 

 54  Unless otherwise specified, testing [place]  [address]

 

 55  This sample is drawn by:CT

 

 56  % FREE PSA    PROBABILITY OF CANCER



   0 - 10%              56%



   10 - 15%              28%



   15 - 20%              20%



   20 - 25%              16%



   GREATER THAN 25%       8%



   



   THE FREE PSA PERCENTAGE IS AN AID IN



   DISTINGUISHING PROSTATE CANCER FROM



   BENIGN PROSTATIC CONDITIONS IN MEN



   AGE 50 AND OLDER WITH A TOTAL PSA



   BETWEEN 3 AND 10 NG/ML AND NEGATIVE



   DIGITAL RECTAL EXAMINATION FINDINGS.



   PROSTATIC BIOPSY IS REQUIRED FOR THE



   DIAGNOSIS OF CANCER.



   (See:  KATIUSKA 1998;  279: [id number])



   



   METHOD USED TO ASSAY BOTH FREE PSA



   AND TOTAL PSA IS TANGELA ACCESS



   IMMUNOASSAY SYSTEM HYTouristlinkITECH FREE



   PSA AND TOTAL PSA.  RESULTS SHOULD



   NOT BE INTERPRETED AS ABSOLUTE



   EVIDENCE FOR THE PRESENCE OR



   ABSENCE OF MALIGNANT DISEASE.



   VALUES OBTAINED WITH DIFFERENT ASSAY



   METHODS OR KITS CANNOT BE USED



   INTERCHANGEABLY.



   Unless otherwise specified, testing performed by [facility]



   [facility]  [address] [place] [address]  [id number]

 

 57  This sample is drawn by:MM

 

 58  Effective 3/2/09, the analyzer and reagent package for the prothombin 
times have changed. The equivalent protime result in "seconds: will be higher 
by this new assay package.

 

 59  Due to a change in method and reagent on 3/2/09 the mean of the normal 
range has changed slightly.  Since this is used in the calculation of the INR, 
the resulting INR may be slightly different than the patient's previous history.



   



   IT MUST BE STRESSED THAT THE INR IS SPECIFICALLY INTENDED FOR ASSESSING 
PATIENTS STABILIZED ON LONG-TERM ANTICOAGULANT THERAPY.



   THE RECOMMENDED THERAPEUTIC RANGE FOR:



   MOST PATIENTS ON COUMADIN                                                   
  INR: 2.0 - 3.0



   PROPHYLAXIS/TREATMENT OF VENOUS THROMBOSIS



   PROPHYLAXIS/TREATMENT OF PULMONARY EMBOLIS



   



   PROSTHETIC HEART VALVES                                                     
     INR: 2.5 - 3.5



   RECURRENT SYSTEMIC EMBOLISM

 

 60  FASTING This sample is drawn by:CT

 

 61  The difference between the most recent result of 10.0 and the current 
result



   of 9.3 exceeds the absolute delta value of 0.3 as defined for this test.

 

 62  Concerning GFR GUIDELINES:



   Normal Function or Mild Renal Disease, if clinically at risk: >/=60mL/min



   Moderately decreased:                                                       
30-59



   Severely decreased:                                                         
  15-29



   Renal Failure:                                                              
        <15



   



   Glomerular Filtration Rate (GFR) is estimated based on the MDRD equation, 
which assumes a steady state for creatinine as



   recommended by the National Kidney Disease Education Program in conjunction 
with the National Institutes of Health and the



   National Kidney Foundation.



   



   Clinical conditions in which it may be necessary to measure GFR by using 
clearance methods include extremes of age and body size, severe malnutrition or 
obesity, diseases of skeletal muscle, paraplegia



   or quadriplegia, vegetarian diet, rapidly changing kidney function, and 
calculation of the dose of potentially toxic drugs that are excreted by the 
kidneys.

 

 63  Concerning GFR GUIDELINES:



   Normal Function or Mild Renal Disease, if clinically at risk: >/=60mL/min



   Moderately decreased:                                                       
30-59



   Severely decreased:                                                         
  15-29



   Renal Failure:                                                              
        <15

 

 64  PER NCEP ATP III GUIDELINES:



   RESULTS LOWER THAN 40 MG/DL ARE SUGGESTIVE OF INCREASED RISK



   FOR CORONARY ARTERY DISEASE.  RESULTS > OR=TO 60 MG/DL  ARE



   CONSIDERED A NEGATIVE RISK FACTOR.

 

 65  INTERPRETATION OF CHOL-HDL RATIO



   



   CHD RISK                         FEMALE                   MALE



   VERY HIGH                         >8.3                    >14.3



   HIGH                              5.6 - 8.3              6.7 - 14.3



   AVERAGE                           3.7 - 5.6               4.0 - 6.7



   BELOW AVERAGE                     2.5 - 3.7               2.7 - 4.0



   PROTECTED                         <2.5                    <2.7

 

 66  PER NCEP ATP III GUIDELINES:



   OPTIMAL: <100



   NEAR OPTIMAL: 100 - 129



   BORDERLINE HIGH: 130 - 159



   HIGH: 160 - 189



   VERY HIGH: >189

 

 67  BEGINNING 07, PSA VALUES ASSAYED AT [facility] USES AN EIA 
METHODOLOGY MANUFACTURED BY [employer] FOR USE ON THE DXI ANALYZER.  
VALUES OBTAINED WITH DIFFERENT ASSAY METHODS OR KITS CAN



   NOT BE USED INTERCHANGEABLY.  SERUM PSA MEASUREMENT IS NOT AN ABSOLUTE TEST 
FOR MALIGNANCY, THE PSA VALUE SHOULD BE USED IN CONJUNCTION WITH INFORMATION 
AVAILABLE FROM CLINICAL EVALUATION AND OTHER DIAGNOSTIC PROCEDURES.

 

 68  FASTING This sample is drawn by: DB

 

 69  BEGINNING 07, PSA VALUES ASSAYED AT [facility] USES AN EIA 
METHODOLOGY MANUFACTURED BY [employer] FOR USE ON THE DXI ANALYZER.  
VALUES OBTAINED WITH DIFFERENT ASSAY METHODS OR KITS CAN



   NOT BE USED INTERCHANGEABLY.  SERUM PSA MEASUREMENT IS NOT AN ABSOLUTE TEST 
FOR MALIGNANCY, THE PSA VALUE SHOULD BE USED IN CONJUNCTION WITH INFORMATION 
AVAILABLE FROM CLINICAL EVALUATION AND OTHER DIAGNOSTIC PROCEDURES.

 

 70  PER NCEP ATP III GUIDELINES:



   RESULTS LOWER THAN 40 MG/DL ARE SUGGESTIVE OF INCREASED RISK



   FOR CORONARY ARTERY DISEASE.  RESULTS > OR=TO 60 MG/DL  ARE



   CONSIDERED A NEGATIVE RISK FACTOR.

 

 71  PER NCEP ATP III GUIDELINES:



   OPTIMAL: <100



   NEAR OPTIMAL: 100 - 129



   BORDERLINE HIGH: 130 - 159



   HIGH: 160 - 189



   VERY HIGH: >189

 

 72  INTERPRETATION OF CHOL-HDL RATIO



   



   CHD RISK                         FEMALE                   MALE



   VERY HIGH                         >8.3                    >14.3



   HIGH                              5.6 - 8.3              6.7 - 14.3



   AVERAGE                           3.7 - 5.6               4.0 - 6.7



   BELOW AVERAGE                     2.5 - 3.7               2.7 - 4.0



   PROTECTED                         <2.5                    <2.7

 

 73  The difference between the most recent result of 8.8 and the current result



   of 10.0 exceeds the absolute delta value of 0.3 as defined for this test.

 

 74  Concerning GFR GUIDELINES:



   Normal Function or Mild Renal Disease, if clinically at risk: >/=60mL/min



   Moderately decreased:                                                       
30-59



   Severely decreased:                                                         
  15-29



   Renal Failure:                                                              
        <15



   



   Glomerular Filtration Rate (GFR) is estimated based on the MDRD equation, 
which assumes a steady state for creatinine as



   recommended by the National Kidney Disease Education Program in conjunction 
with the National Institutes of Health and the



   National Kidney Foundation.



   



   Clinical conditions in which it may be necessary to measure GFR by using 
clearance methods include extremes of age and body size, severe malnutrition or 
obesity, diseases of skeletal muscle, paraplegia



   or quadriplegia, vegetarian diet, rapidly changing kidney function, and 
calculation of the dose of potentially toxic drugs that are excreted by the 
kidneys.

 

 75  Concerning GFR GUIDELINES:



   Normal Function or Mild Renal Disease, if clinically at risk: >/=60mL/min



   Moderately decreased:                                                       
30-59



   Severely decreased:                                                         
  15-29



   Renal Failure:                                                              
        <15

 

 76  FASTING

 

 77  The difference between the most recent result of 8.4 and the current result



   of 8.8 exceeds the absolute delta value of 0.3 as defined for this test.

 

 78  The difference between the most recent result of 1.5 and the current result



   of 0.8 exceeds the absolute delta value of 0.3 as defined for this test.

 

 79  Concerning GFR GUIDELINES:



   Normal Function or Mild Renal Disease, if clinically at risk: >/=60mL/min



   Moderately decreased:                                                       
30-59



   Severely decreased:                                                         
  15-29



   Renal Failure:                                                              
        <15



   



   Glomerular Filtration Rate (GFR) is estimated based on the MDRD equation, 
which assumes a steady state for creatinine as



   recommended by the National Kidney Disease Education Program in conjunction 
with the National Institutes of Health and the



   National Kidney Foundation.



   



   Clinical conditions in which it may be necessary to measure GFR by using 
clearance methods include extremes of age and body size, severe malnutrition or 
obesity, diseases of skeletal muscle, paraplegia



   or quadriplegia, vegetarian diet, rapidly changing kidney function, and 
calculation of the dose of potentially toxic drugs that are excreted by the 
kidneys.

 

 80  Concerning GFR GUIDELINES:



   Normal Function or Mild Renal Disease, if clinically at risk: >/=60mL/min



   Moderately decreased:                                                       
30-59



   Severely decreased:                                                         
  15-29



   Renal Failure:                                                              
        <15

 

 81  % FREE PSA    PROBABILITY OF CANCER



   0 - 10%              56%



   10 - 15%              28%



   15 - 20%              20%



   20 - 25%              16%



   GREATER THAN 25%       8%



   



   THE FREE PSA PERCENTAGE IS AN AID IN



   DISTINGUISHING PROSTATE CANCER FROM



   BENIGN PROSTATIC CONDITIONS IN MEN



   AGE 50 AND OLDER WITH A TOTAL PSA



   BETWEEN 3 AND 10 NG/ML AND NEGATIVE



   DIGITAL RECTAL EXAMINATION FINDINGS.



   PROSTATIC BIOPSY IS REQUIRED FOR THE



   DIAGNOSIS OF CANCER.



   (See:  KATIUSKA 1998;  279: [id number])



   



   METHOD USED TO ASSAY BOTH FREE PSA



   AND TOTAL PSA IS TANGELA ACCESS



   IMMUNOASSAY SYSTEM HYBRITECH FREE



   PSA AND TOTAL PSA.  RESULTS SHOULD



   NOT BE INTERPRETED AS ABSOLUTE



   EVIDENCE FOR THE PRESENCE OR



   ABSENCE OF MALIGNANT DISEASE.



   VALUES OBTAINED WITH DIFFERENT ASSAY



   METHODS OR KITS CANNOT BE USED



   INTERCHANGEABLY.



   FREE PSA % GUIDELINES:



   %FREE PSA                                                    PROBABILITY OF 
CANCER



   0-10%                                                                       
       56%



   10-15%                                                                      
      28%



   15-20%                                                                      
      20%



   20-25%                                                                      
      16%



   GREATER THAN 25%                                                     8%



   



   METHOD USED FOR FREE AND TOTAL PSA IS THE TANGELA ACCESS IMMUNOASSAY SYSTEM 
HYBRITECH FREE PSA AND TOTAL PSA. RESULT SHOULD NOT BE INTERPRETED AS ABSOLUTE 
EVIDENCE FOR THE PRESENCE OR ABSENCE OF MALIGNA



   NT DISEASE. VALUES OBTAINED WITH DIFFERENT ASSAY METHODS OR KITS CANNOT BE 
USED INTERCHANGEABLY.

 

 82  The difference between the most recent result of 9.2 and the current result



   of 8.4 exceeds the absolute delta value of 0.3 as defined for this test.

 

 83  Normal Function or Mild Renal Disease, if clinically at risk:  >/=60 mL/min



   Moderately decreased:                                                        
30-59



   Severely decreased:                                                         
   15-29



   Renal Failure:                                                              
          <15



   



   Glomerular Filtration Rate (GFR) is estimated based on the MDRD equation, 
which assumes a steady state for creatinine as recommended by the National 
Kidney Disease Education Program in conjunction with



   the National Institutes of Health and the National Kidney Foundation.



   



   Clinical conditions in which it may be necessary to measure GFR by using 
clearance methods include extremes of age and body size, severe malnutrition or 
obesity, diseases of skeletal muscle, paraplegia



   or quadriplegia, vegetarian diet, rapidly changing kidney function, and 
calculation of the dose of potentially toxic drugs that are excreted by the 
kidneys.

 

 84  .



   Results obtained using [employer] MEIA methodology.



   Serum PSA results should be used only in conjunction with information



   available from the clinical evaluation of the patient and other



   diagnostic procedures. Values obtained with different assay methods or



   kits cannot be used interchangeably.



   .

 

 85  .



   Percent Free PSA Value Range    <10    10-15   15-20   20-26  >26



   ------------------------------------------------------------------------



   Probability of Prostate Cancer 67.9%   50.0%   32.1%   18.3%  9.6%

 

 86  PSA VALUES ASSAYED AT [facility] USES AN EIA METHODOLOGY 
MANUFACTURED BY [employer] FOR USE ON THE NEXIA ANALYZER.  VALUES 
OBTAINED WITH DIFFERENT ASSAY METHODS OR KITS CAN NOT BE USED INT



   ERCHANGEABLY.  SERUM PSA MEASUREMENT IS NOT AN ABSOLUTE TEST FOR MALIGNANCY, 
THE PSA VALUE SHOULD BE USED IN CONJUNCTION WITH INFORMATION AVAILABLE FROM 
CLINICAL EVALUATION AND OTHER DIAGNOSTIC PROCEDURES.

 

 87  The difference between the most recent result of 8.8 and the current result



   of 9.2 exceeds the absolute delta value of 0.3 as defined for this test.

 

 88  PSA VALUES ASSAYED AT [facility] USES AN EIA METHODOLOGY 
MANUFACTURED BY [employer] FOR USE ON THE NEXIA ANALYZER.  VALUES 
OBTAINED WITH DIFFERENT ASSAY METHODS OR KITS CAN NOT BE USED INT



   ERCHANGEABLY.  SERUM PSA MEASUREMENT IS NOT AN ABSOLUTE TEST FOR MALIGNANCY, 
THE PSA VALUE SHOULD BE USED IN CONJUNCTION WITH INFORMATION AVAILABLE FROM 
CLINICAL EVALUATION AND OTHER DIAGNOSTIC PROCEDURES.

 

 89  Pos: 5 of the following



   Borrelia-specific



   bands: 18,23,28,30,39



   41,45,58,66, and 93.



   Neg: No bands or banding



   patterns which do



   not meet positive



   criteria.



   .

 

 90  Note: An equivocal or pos-           Pos: 2 of the following



   itive EIA result followed               bands: 23,39, or 41.



   by a negative Western Blot          Neg: No bands or banding



   result is considered NEGATIVE.             patterns which do



   An equivocal or positive EIA               not meet positive



   result followed by a positive              criteria.



   Western Blot is considered



   POSITIVE by the CDC.



   .



   Criteria for positivity are those



   recommended by CDC/ASTPHLD.



   [id number] C, q45=[id number].



   .

 

 91  PSA VALUES ASSAYED AT [facility] USES AN EIA METHODOLOGY 
MANUFACTERED BY [employer] FOR USE ON THE NEXIA ANALYZER.  VALUES 
OBTAINED WITH DIFFERENT ASSAY METHODS OR KITS CAN NOT BE USED INT



   ERCHANGEABLY.  SERUM PSA MEASUREMENT IS NOT AN ABSOLUTE TEST FOR MALIGNANCY, 
THE PSA VALUE SHOULD BE USED IN CONJUNCTION WITH INFORMATION AVAILABLE FROM 
CLINICAL EVALUATION AND OTHER DIAGNOSTIC PROCEDURES.

 

 92  PENDING



   PENDING



   PENDING



   PENDING



   PENDING



   PENDING



   PENDING



   PENDING



   PENDING



   PENDING



   PENDING



   PENDING



   PENDING



   PENDING



   PENDING



   PENDING



   PENDING

 

 93  PSA VALUES ASSAYED AT [facility] USES AN EIA METHODOLGY 
MANUFACTURED BY



   [employer] FOR USE ON THE IMMUNO 1 ANALYZER.  VALUES OBTAINED WITH 
DIFFERENT



   ASSAY METHODS OR KITS CAN NOT BE USED INTERCHANGEABLY.  SERUM PSA 
MEASUREMENT IS NOT AN ABSOLUTE TEST FOR MALIGNANCY.  THE PSA VALUE SHOULD BE 
USED IN CONJUCTION WITH



   INFORMATION AVAILABLE FROM CLINICAL EVALUATION AND OTHER DIAGNOSTIC 
PROCEDURES.







Procedures







 Date  CPT Code  Description  Status

 

 09/10/2018  85322  Spirometry /PFT With And Without Bronchodialator  Completed



     (Bronchospasm)  

 

 2018  53275  Spirometry /PFT With And Without Bronchodialator  Completed



     (Bronchospasm)  

 

 10/26/2017  71754  Electrocardiogram Complete  Completed

 

 10/24/2016  53325  Electrocardiogram Complete  Completed

 

 2016  08811  Measure Blood Oxygen Level Single Determination  Completed

 

 2015  79650  Airway Inhalation Treatment  Completed

 

 10/20/2014  77573  Electrocardiogram Complete  Completed

 

 09/10/2012  43987  Electrocardiogram Complete  Completed

 

 2011  90195  Electrocardiogram Complete  Completed

 

 12/15/2010  66265  Electrocardiogram Complete  Completed

 

 2010  44406  Remove Impacted Cerumen Requiring Instrumentation  Completed

 

 02/15/2010  20812  Admin Of Inj (Therapeutic Phrophylactic Or Diagnostic  
Completed



     Subq Inj  

 

 12/15/2009  78171  Admin Of Inj (Therapeutic Phrophylactic Or Diagnostic  
Completed



     Subq Inj  

 

 2009  65836  Electrocardiogram Complete  Completed

 

 2007  90143  Electrocardiogram Complete  Completed

 

 2007  61893  Admin Of Inj (Therapeutic,Prophylactic Or Diagnostic  
Completed



     Subq Or Intr  

 

 2005  48330  Electrocardiogram Interpretation & Report Only  Completed

 

 2005  51844  Electrocardiogram Tracing  Completed

 

 2005  60171  Remove Impacted Cerumen Requiring Instrumentation  Completed

 

 2004  27101  Duplex Scan Extracranial Arteries, Complete Bilateral  
Completed



     Study  

 

 2004  54820  Electrocardiogram Complete  Completed

 

 2001  52860  Electrocardiogram Complete  Completed

 

 10/23/2000  58813  Sigmoidoscopy Diagnostic  Completed

 

 2000  66019  Electrocardiogram Complete  Completed







Encounters







 Type  Date  Location  Provider  CPT E/M  Dx

 

 Office Visit  2018 10:00a  Indio Willis PA  34412  J15.9









 I10

 

 I48.2

 

 G47.33

 

 I45.10

 

 N40.0

 

 D53.9

 

 M79.662

 

 K21.9

 

 R41.3

 

 R06.02

 

 J44.9

 

 Z68.27









 Office Visit  2018 11:00a  Indio Willis PA  70802  R04.2









 Z68.27









 Office Visit  2017 11:00a  Kimberley Thapa MD  87798  R05









 J45.20









 Office Visit  2017  1:00p  Indio Willis PA  79677  I48.2









 G47.33

 

 R41.3

 

 R23.3









 Office Visit  2017  2:20p  Dulce Rico RN MS [facility]  26422  
M79.602









 R23.3

 

 D50.9









 Office Visit  2017 10:40a  Indio Willis PA  86241  J06.9









 G47.33









 Office Visit  10/26/2017 10:00a  Indio Willis PA    Z00.01









 I48.2

 

 G47.33

 

 N40.0

 

 R97.20

 

 E55.9

 

 Z86.010

 

 I45.10

 

 K92.2

 

 J45.20

 

 R41.3

 

 Z23

 

 E78.2









 Office Visit  2017  2:00p  Tammi Dominguez PA  55525  K92.2









 D64.89

 

 I48.1









 Office Visit  2017  2:00p  Tammi Dominguez PA  38104  J45.20









 R05









 Office Visit  10/24/2016  1:00p  Indio Willis PA    Z00.01









 I10

 

 I48.2

 

 J44.9

 

 G47.33

 

 N40.0

 

 R97.20

 

 E55.9

 

 Z86.010

 

 I45.10

 

 Z23

 

 R68.2









 Office Visit  2016  9:00a  Indio Willis PA  05050  L03.115

 

 Office Visit  2016  9:40a  Indio Willis PA  91684  L03.115









 I73.9









 Office Visit  2016  1:40p  Dulce Rico RN MS [facility]  93033  
K59.00

 

 Office Visit  2016  3:00p  Bonnie Garza MD  09598  E55.9









 N40.0

 

 D51.9

 

 I10

 

 G47.33

 

 I48.2

 

 R41.3

 

 J45.909









 Office Visit  10/21/2015  1:00p  Bonnie Garza MD    Z23









 E78.0

 

 D51.9

 

 N40.0

 

 E55.9

 

 I10

 

 I48.91

 

 R97.2

 

 R41.3

 

 J45.909

 

 G47.33

 

 Z00.01









 Office Visit  2015 11:00a  Bonnie Garza MD  72976  427.31









 401.1

 

 584.9

 

 V54.13

 

 780.93

 

 600.00

 

 496

 

 530.81









 Office Visit  2015  9:40a  Bonnie Garza MD  68850  786.09

 

 Office Visit  2015 10:40a  Bonnie Garza MD  39480  428.0









 427.31

 

 564.09

 

 401.1

 

 V54.13

 

 584.9

 

 719.47

 

 281.1

 

 272.2









 Office Visit  10/29/2014  9:40a  Dulce Rico RN [facility]  73735  
380.4

 

 Office Visit  10/20/2014 10:20a  Bonnie Garza MD  75564  V04.81









 V04.89

 

 V70.0

 

 380.4

 

 401.1

 

 427.31

 

 600.00

 

 V77.1

 

 V76.44

 

 780.93

 

 V03.82









 Office Visit  2014  1:00p  Dulce Rico RN [facility]  66358  
465.9









 786.2

 

 461.0









 Office Visit  2014  1:00p  Bonnie Garza MD  53125  478.9









 562.12

 

 786.09

 

 709.3









 Office Visit  2014 11:00a  Dulce Rico RN [facility]  87127  
786.2

 

 Office Visit  02/10/2014  9:30a  Bonnie Garza MD  13019  789.9









 599.70

 

 788.99









 Office Visit  10/28/2013 10:45a  Aron Thompson MD  93970  427.31









 401.1

 

 272.2

 

 428.0

 

 790.93

 

 V04.81









 Office Visit  2013 10:00a  Bonnie Garza MD  96700  478.20

 

 Office Visit  2013  9:30a  Aron Thompson MD  18217  427.31









 401.1

 

 272.2

 

 428.0

 

 719.47









 Office Visit  10/22/2012 11:15a  Aron Thompson MD  37876  427.31









 428.0

 

 401.1

 

 272.2

 

 790.93

 

 V04.81









 Office Visit  09/10/2012  8:30a  Aron Thompson MD  78992  427.31









 428.0

 

 401.1

 

 272.2









 Office Visit  2012  4:15p  Aron Thompson MD  15140  923.03

 

 Office Visit  2012  9:45a  Aron Thompson MD  05445  599.70









 427.31

 

 790.93









 Office Visit  2011 11:30a  Aron Thompson MD  23812  427.31









 401.1

 

 272.2

 

 426.4

 

 790.93

 

 V04.81









 Office Visit  2011 10:20a  Dulce Rico RN [facility]  70262  
401.1









 427.31

 

 564.01









 Office Visit  2011 10:30a  Aron Thompson MD  47629  427.31









 401.1

 

 790.93









 Office Visit  2011 11:15a  Aron Thompson MD  87877  427.31









 401.1

 

 790.93









 Office Visit  02/15/2011  3:00p  Nancy Cruz  24646  448.1









 906.0









 Office Visit  2011 10:00a  Aron Thmopson MD  46338  427.31









 790.93









 Office Visit  12/15/2010  3:15p  Aron Thompson MD  60784  V70.0









 427.31

 

 786.05

 

 401.1

 

 426.4

 

 600.00

 

 715.94

 

 719.41

 

 780.93

 

 272.2









 Office Visit  10/27/2010 11:00a  Dulce Rico RN [facility]  41245  
729.5









 V04.81









 Office Visit  10/25/2010 12:30p  Dulce Rico, RN MS [facility]  24202  
729.5

 

 Office Visit  2010  2:45p  Kimberley Thapa MD  87385  461.0









 380.4









 Office Visit  2010  9:30a  Dulce Rico RN [place] P  64713  
893.0

 

 Office Visit  2009  3:30p  Aron Thompson MD  33860  427.89









 426.4

 

 562.12

 

 715.94

 

 780.93

 

 401.1

 

 600.00

 

 727.03

 

 272.2









 Office Visit  2009  1:50p  Nancy  Nurses Schedule Nancy  89563  739.6

 

 Office Visit  2009 10:30a  Aron Thompson MD  11315  715.94









 727.03









 Office Visit  10/24/2008  9:20a  Nancy  Nurses Schedule Nancy  [id number]  462

 

 Office Visit  2008 11:30a  Aron Thompson MD  47473  562.12









 401.1

 

 780.93

 

 680.3









 Office Visit  2008 11:50a  Aron Thompson MD  81604  680.3









 401.1









 Office Visit  2008 10:10a  Aron Thompson MD  08900  724.2

 

 Office Visit  2008  2:00p  Aron Thompson MD  06346  401.1









 780.93

 

 053.9









 Office Visit  2007  9:40a  Aron Thompson MD  60338  053.9









 401.1

 

 780.93









 Office Visit  2007  9:30a  Aron Thompson MD  49102  462









 786.2









 Office Visit  2007  9:30a  Aron Thompson MD  18908  401.1









 600.00

 

 272.2

 

 496

 

 784.0

 

 715.00

 

 562.10

 

 780.93

 

 794.31









 Office Visit  2007  1:30p  Aron Thompson MD  60683  724.2









 401.1









 Office Visit  2007  1:15p  Kimberley Thapa MD  89435  724.2









 E885.9









 Office Visit  2006 11:00a  Aron Thompson MD  89707  401.1









 562.11

 

 427.89









 Office Visit  2006  9:15a  Dulce Rico RN [facility]  11432  
562.11

 

 Office Visit  2006  8:20a  Kimberley Thapa MD  91771  562.11









 569.89

 

 564.00









 Office Visit  06/15/2006  8:00a  Dulce Rico RN [facility]  95661  
789.9

 

 Office Visit  2006  2:45p  Dulce Rico RN [facility]  91295  
787.91









 789.9









 Office Visit  05/15/2006  8:50a  Aron Thompson MD  60227  401.1









 600.00

 

 272.2









 Office Visit  2006  9:00a  Aron Thompson MD  14604  401.1









 600.00

 

 790.93

 

 V76.44









 Office Visit  2005  3:00p  Aron Thompson MD  10310  401.1









 780.4









 Office Visit  2005  1:30p  Kimberley Thapa MD  00604  401.1

 

 Office Visit  2005  8:50a  Aron Thompson MD  02222  401.1









 272.2

 

 780.79

 

 600.00

 

 496

 

 V76.44









 Office Visit  2005  9:20a  Dulce Rico RN [facility]  44047  
784.0

 

 Office Visit  10/13/2004  2:00p  Aron Thompson MD  63139  401.1









 V76.44

 

 780.4

 

 780.79

 

 365.9

 

 455.3

 

 V03.82

 

 V04.81

 

 600.00









 Office Visit  2004 10:00a  Aron Thompson MD  94751  600.0









 401.1

 

 691.8

 

 455.3

 

 715.00









 Office Visit  10/02/2003  1:20p  Dulce Rico RN [facility]  47112  
602.9









 692.6

 

 919.5

 

 V04.8









 Office Visit  2003  2:10p  Dulce Rico, RN [place] [facility]  60993  
682.6

 

 Office Visit  2003  2:10p  Dulce Rico, RN [place] [facility]  24214  
682.6

 

 Office Visit  04/10/2003  2:10p  Dulce Rico, RN [place] [facility]  44361  
692.9

 

 Office Visit  2002 11:10a  Dulce Rico RN MS [facility]  91319  
883.0

 

 Office Visit  2002  1:50p  Dulce Rico, RN MS [facility]  56559  
382.00

 

 Office Visit  2002 11:10a  Aron Thompson MD  14443  

 

 Office Visit  2001  2:40p  Aron Thompson MD  49750  

 

 Office Visit  2001 12:00p  Aron Thompson MD  39813  

 

 Office Visit  2000  3:20p  Aron Thompson MD  45477  V76.44







Plan of Care

Future Appointment(s):2018 10:00 am - Indio Claudio PA at [place]/
2018 - Indio Claudio PAJ44.9 Chronic obstructive pulmonary disease, 
unspecifiedComments:?seeing pulmonology still.  Will Rx Samples Trelegy.  +
Severe Obstruction.  UPDATE:feeling much better with Trelegy.  Recheck PFT 
today and send Rx to the pharmacy.Follow up:f/u 2018 for AWE.I48.2 Chronic 
atrial fibrillationComments:seeing cardio.  cut back on the Pradaxa with 
bruising in the left upper arm.  will resume and f/u with cardiology.I10 
Essential (primary) hypertensionComments:stable on medsG47.33 Obstructive sleep 
apnea (adult) (pediatric)Comments:CPAP usage and feeling more rested.  +vivid 
dreams  .  seeing pulm.R41.3 Other amnesiaComments:worsening slowly.  added 
Namenda to Aricept.  seeing RemalooquT82.10 Unspecified RIGHT bundle-branch 
blockComments:qcqspwR62.0 Benign prostatic hyperplasia without lower urinry 
tract sympComments:seeing Dr Sky -- no records yet.  recheck xvmzzF28.9 
Vitamin D deficiency, unspecifiedComments:hoxqpbV73.27 Body mass index (BMI) 
27.0-27.9, adult